# Patient Record
Sex: MALE | Race: WHITE | NOT HISPANIC OR LATINO | Employment: OTHER | URBAN - METROPOLITAN AREA
[De-identification: names, ages, dates, MRNs, and addresses within clinical notes are randomized per-mention and may not be internally consistent; named-entity substitution may affect disease eponyms.]

---

## 2017-01-01 ENCOUNTER — OFFICE VISIT (OUTPATIENT)
Dept: PULMONOLOGY | Facility: HOSPICE | Age: 82
End: 2017-01-01
Payer: MEDICARE

## 2017-01-01 ENCOUNTER — APPOINTMENT (OUTPATIENT)
Dept: RADIOLOGY | Facility: MEDICAL CENTER | Age: 82
DRG: 987 | End: 2017-01-01
Attending: UROLOGY
Payer: MEDICARE

## 2017-01-01 ENCOUNTER — APPOINTMENT (OUTPATIENT)
Dept: RADIOLOGY | Facility: MEDICAL CENTER | Age: 82
DRG: 987 | End: 2017-01-01
Attending: INTERNAL MEDICINE
Payer: MEDICARE

## 2017-01-01 ENCOUNTER — TELEPHONE (OUTPATIENT)
Dept: PULMONOLOGY | Facility: HOSPICE | Age: 82
End: 2017-01-01

## 2017-01-01 ENCOUNTER — APPOINTMENT (OUTPATIENT)
Dept: OTHER | Facility: MEDICAL CENTER | Age: 82
End: 2017-01-01
Attending: INTERNAL MEDICINE
Payer: MEDICARE

## 2017-01-01 ENCOUNTER — HOSPITAL ENCOUNTER (OUTPATIENT)
Dept: LAB | Facility: MEDICAL CENTER | Age: 82
End: 2017-06-16
Attending: INTERNAL MEDICINE
Payer: MEDICARE

## 2017-01-01 ENCOUNTER — HOSPITAL ENCOUNTER (OUTPATIENT)
Facility: MEDICAL CENTER | Age: 82
End: 2017-05-19
Attending: PHYSICIAN ASSISTANT
Payer: MEDICARE

## 2017-01-01 ENCOUNTER — APPOINTMENT (OUTPATIENT)
Dept: ADMISSIONS | Facility: MEDICAL CENTER | Age: 82
End: 2017-01-01
Payer: MEDICARE

## 2017-01-01 ENCOUNTER — OFFICE VISIT (OUTPATIENT)
Dept: MEDICAL GROUP | Facility: MEDICAL CENTER | Age: 82
End: 2017-01-01
Payer: MEDICARE

## 2017-01-01 ENCOUNTER — NON-PROVIDER VISIT (OUTPATIENT)
Dept: MEDICAL GROUP | Facility: MEDICAL CENTER | Age: 82
End: 2017-01-01
Payer: MEDICARE

## 2017-01-01 ENCOUNTER — TELEPHONE (OUTPATIENT)
Dept: MEDICAL GROUP | Facility: MEDICAL CENTER | Age: 82
End: 2017-01-01

## 2017-01-01 ENCOUNTER — HOSPITAL ENCOUNTER (OUTPATIENT)
Dept: OTHER | Facility: MEDICAL CENTER | Age: 82
End: 2017-05-23
Attending: INTERNAL MEDICINE
Payer: MEDICARE

## 2017-01-01 ENCOUNTER — HOSPITAL ENCOUNTER (OUTPATIENT)
Dept: OTHER | Facility: MEDICAL CENTER | Age: 82
End: 2017-06-06
Attending: INTERNAL MEDICINE
Payer: MEDICARE

## 2017-01-01 ENCOUNTER — HOSPITAL ENCOUNTER (OUTPATIENT)
Dept: OTHER | Facility: MEDICAL CENTER | Age: 82
End: 2017-04-25
Attending: INTERNAL MEDICINE
Payer: MEDICARE

## 2017-01-01 ENCOUNTER — TELEPHONE (OUTPATIENT)
Dept: URGENT CARE | Facility: CLINIC | Age: 82
End: 2017-01-01

## 2017-01-01 ENCOUNTER — HOSPITAL ENCOUNTER (OUTPATIENT)
Dept: RADIOLOGY | Facility: MEDICAL CENTER | Age: 82
End: 2017-06-26
Attending: INTERNAL MEDICINE
Payer: MEDICARE

## 2017-01-01 ENCOUNTER — HOSPITAL ENCOUNTER (OUTPATIENT)
Facility: MEDICAL CENTER | Age: 82
End: 2017-10-16
Attending: UROLOGY | Admitting: UROLOGY
Payer: MEDICARE

## 2017-01-01 ENCOUNTER — HOSPITAL ENCOUNTER (OUTPATIENT)
Dept: LAB | Facility: MEDICAL CENTER | Age: 82
End: 2017-08-14
Attending: NURSE PRACTITIONER
Payer: MEDICARE

## 2017-01-01 ENCOUNTER — OFFICE VISIT (OUTPATIENT)
Dept: URGENT CARE | Facility: PHYSICIAN GROUP | Age: 82
End: 2017-01-01
Payer: MEDICARE

## 2017-01-01 ENCOUNTER — HOSPITAL ENCOUNTER (OUTPATIENT)
Facility: MEDICAL CENTER | Age: 82
End: 2017-09-20
Attending: UROLOGY | Admitting: UROLOGY
Payer: MEDICARE

## 2017-01-01 ENCOUNTER — APPOINTMENT (OUTPATIENT)
Dept: RADIOLOGY | Facility: IMAGING CENTER | Age: 82
End: 2017-01-01
Attending: INTERNAL MEDICINE
Payer: MEDICARE

## 2017-01-01 ENCOUNTER — HOSPITAL ENCOUNTER (OUTPATIENT)
Dept: OTHER | Facility: MEDICAL CENTER | Age: 82
End: 2017-04-14
Attending: INTERNAL MEDICINE
Payer: MEDICARE

## 2017-01-01 ENCOUNTER — OFFICE VISIT (OUTPATIENT)
Dept: CARDIOLOGY | Facility: MEDICAL CENTER | Age: 82
End: 2017-01-01
Payer: MEDICARE

## 2017-01-01 ENCOUNTER — HOSPITAL ENCOUNTER (OUTPATIENT)
Dept: OTHER | Facility: MEDICAL CENTER | Age: 82
End: 2017-05-30
Attending: INTERNAL MEDICINE
Payer: MEDICARE

## 2017-01-01 ENCOUNTER — TELEPHONE (OUTPATIENT)
Dept: CARDIOLOGY | Facility: MEDICAL CENTER | Age: 82
End: 2017-01-01

## 2017-01-01 ENCOUNTER — HOSPITAL ENCOUNTER (OUTPATIENT)
Dept: OTHER | Facility: MEDICAL CENTER | Age: 82
End: 2017-05-09
Attending: INTERNAL MEDICINE
Payer: MEDICARE

## 2017-01-01 ENCOUNTER — HOSPITAL ENCOUNTER (OUTPATIENT)
Dept: OTHER | Facility: MEDICAL CENTER | Age: 82
End: 2017-04-27
Attending: INTERNAL MEDICINE
Payer: MEDICARE

## 2017-01-01 ENCOUNTER — HOSPITAL ENCOUNTER (INPATIENT)
Facility: MEDICAL CENTER | Age: 82
LOS: 2 days | DRG: 987 | End: 2017-11-04
Attending: UROLOGY | Admitting: INTERNAL MEDICINE
Payer: MEDICARE

## 2017-01-01 ENCOUNTER — HOSPITAL ENCOUNTER (OUTPATIENT)
Facility: MEDICAL CENTER | Age: 82
End: 2017-06-06
Attending: INTERNAL MEDICINE
Payer: MEDICARE

## 2017-01-01 ENCOUNTER — HOSPITAL ENCOUNTER (OUTPATIENT)
Dept: OTHER | Facility: MEDICAL CENTER | Age: 82
End: 2017-05-04
Attending: INTERNAL MEDICINE
Payer: MEDICARE

## 2017-01-01 ENCOUNTER — APPOINTMENT (OUTPATIENT)
Dept: RADIOLOGY | Facility: IMAGING CENTER | Age: 82
End: 2017-01-01
Payer: MEDICARE

## 2017-01-01 ENCOUNTER — APPOINTMENT (OUTPATIENT)
Dept: OTHER | Facility: MEDICAL CENTER | Age: 82
End: 2017-01-01
Payer: MEDICARE

## 2017-01-01 ENCOUNTER — HOSPITAL ENCOUNTER (OUTPATIENT)
Dept: OTHER | Facility: MEDICAL CENTER | Age: 82
End: 2017-02-09
Attending: NURSE PRACTITIONER
Payer: MEDICARE

## 2017-01-01 ENCOUNTER — HOSPITAL ENCOUNTER (OUTPATIENT)
Dept: OTHER | Facility: MEDICAL CENTER | Age: 82
End: 2017-06-01
Attending: INTERNAL MEDICINE
Payer: MEDICARE

## 2017-01-01 ENCOUNTER — HOSPITAL ENCOUNTER (OUTPATIENT)
Dept: OTHER | Facility: MEDICAL CENTER | Age: 82
End: 2017-05-11
Attending: INTERNAL MEDICINE
Payer: MEDICARE

## 2017-01-01 ENCOUNTER — HOSPITAL ENCOUNTER (OUTPATIENT)
Dept: OTHER | Facility: MEDICAL CENTER | Age: 82
End: 2017-05-25
Attending: INTERNAL MEDICINE
Payer: MEDICARE

## 2017-01-01 ENCOUNTER — HOSPITAL ENCOUNTER (OUTPATIENT)
Dept: OTHER | Facility: MEDICAL CENTER | Age: 82
End: 2017-05-02
Attending: INTERNAL MEDICINE
Payer: MEDICARE

## 2017-01-01 VITALS
DIASTOLIC BLOOD PRESSURE: 66 MMHG | HEART RATE: 83 BPM | WEIGHT: 150.6 LBS | OXYGEN SATURATION: 89 % | BODY MASS INDEX: 22.82 KG/M2 | RESPIRATION RATE: 16 BRPM | HEIGHT: 68 IN | TEMPERATURE: 98.2 F | SYSTOLIC BLOOD PRESSURE: 104 MMHG

## 2017-01-01 VITALS
HEART RATE: 88 BPM | OXYGEN SATURATION: 92 % | WEIGHT: 164 LBS | DIASTOLIC BLOOD PRESSURE: 88 MMHG | BODY MASS INDEX: 24.86 KG/M2 | HEIGHT: 68 IN | SYSTOLIC BLOOD PRESSURE: 142 MMHG

## 2017-01-01 VITALS
OXYGEN SATURATION: 94 % | HEIGHT: 68 IN | SYSTOLIC BLOOD PRESSURE: 124 MMHG | WEIGHT: 152.8 LBS | HEART RATE: 84 BPM | BODY MASS INDEX: 23.16 KG/M2 | DIASTOLIC BLOOD PRESSURE: 86 MMHG | RESPIRATION RATE: 16 BRPM

## 2017-01-01 VITALS
WEIGHT: 155.6 LBS | DIASTOLIC BLOOD PRESSURE: 64 MMHG | SYSTOLIC BLOOD PRESSURE: 112 MMHG | TEMPERATURE: 97.5 F | BODY MASS INDEX: 24.42 KG/M2 | HEART RATE: 89 BPM | RESPIRATION RATE: 16 BRPM | OXYGEN SATURATION: 95 % | HEIGHT: 67 IN

## 2017-01-01 VITALS
RESPIRATION RATE: 16 BRPM | DIASTOLIC BLOOD PRESSURE: 86 MMHG | HEART RATE: 89 BPM | SYSTOLIC BLOOD PRESSURE: 140 MMHG | HEIGHT: 67 IN | TEMPERATURE: 97.9 F | BODY MASS INDEX: 26.68 KG/M2 | WEIGHT: 170 LBS

## 2017-01-01 VITALS
DIASTOLIC BLOOD PRESSURE: 74 MMHG | HEART RATE: 87 BPM | HEIGHT: 68 IN | OXYGEN SATURATION: 93 % | TEMPERATURE: 98.2 F | SYSTOLIC BLOOD PRESSURE: 112 MMHG | WEIGHT: 152.45 LBS | BODY MASS INDEX: 23.1 KG/M2 | RESPIRATION RATE: 16 BRPM

## 2017-01-01 VITALS
BODY MASS INDEX: 23.53 KG/M2 | OXYGEN SATURATION: 100 % | SYSTOLIC BLOOD PRESSURE: 153 MMHG | HEART RATE: 65 BPM | TEMPERATURE: 98.6 F | RESPIRATION RATE: 24 BRPM | DIASTOLIC BLOOD PRESSURE: 84 MMHG | WEIGHT: 149.91 LBS | HEIGHT: 67 IN

## 2017-01-01 VITALS
BODY MASS INDEX: 24.23 KG/M2 | SYSTOLIC BLOOD PRESSURE: 132 MMHG | WEIGHT: 154.4 LBS | HEART RATE: 94 BPM | HEIGHT: 67 IN | DIASTOLIC BLOOD PRESSURE: 86 MMHG | OXYGEN SATURATION: 86 %

## 2017-01-01 VITALS
OXYGEN SATURATION: 97 % | DIASTOLIC BLOOD PRESSURE: 95 MMHG | SYSTOLIC BLOOD PRESSURE: 152 MMHG | RESPIRATION RATE: 20 BRPM | WEIGHT: 152.12 LBS | TEMPERATURE: 97.9 F | HEART RATE: 68 BPM | BODY MASS INDEX: 23.88 KG/M2 | HEIGHT: 67 IN

## 2017-01-01 VITALS
BODY MASS INDEX: 25.95 KG/M2 | SYSTOLIC BLOOD PRESSURE: 129 MMHG | OXYGEN SATURATION: 96 % | RESPIRATION RATE: 22 BRPM | DIASTOLIC BLOOD PRESSURE: 83 MMHG | WEIGHT: 165.34 LBS | HEART RATE: 64 BPM | HEIGHT: 67 IN | TEMPERATURE: 98.6 F

## 2017-01-01 VITALS
WEIGHT: 162 LBS | OXYGEN SATURATION: 96 % | BODY MASS INDEX: 25.43 KG/M2 | HEIGHT: 67 IN | SYSTOLIC BLOOD PRESSURE: 136 MMHG | DIASTOLIC BLOOD PRESSURE: 90 MMHG | HEART RATE: 102 BPM | RESPIRATION RATE: 18 BRPM | TEMPERATURE: 97.3 F

## 2017-01-01 VITALS
WEIGHT: 170 LBS | HEART RATE: 112 BPM | OXYGEN SATURATION: 89 % | DIASTOLIC BLOOD PRESSURE: 70 MMHG | BODY MASS INDEX: 25.76 KG/M2 | RESPIRATION RATE: 18 BRPM | HEIGHT: 68 IN | SYSTOLIC BLOOD PRESSURE: 122 MMHG

## 2017-01-01 DIAGNOSIS — R91.8 LUNG MASS: ICD-10-CM

## 2017-01-01 DIAGNOSIS — J44.1 COPD WITH EXACERBATION (HCC): ICD-10-CM

## 2017-01-01 DIAGNOSIS — N30.00 ACUTE CYSTITIS WITHOUT HEMATURIA: ICD-10-CM

## 2017-01-01 DIAGNOSIS — J96.11 CHRONIC RESPIRATORY FAILURE WITH HYPOXIA (HCC): ICD-10-CM

## 2017-01-01 DIAGNOSIS — R60.0 EDEMA OF BOTH LEGS: ICD-10-CM

## 2017-01-01 DIAGNOSIS — N39.0 RECURRENT UTI: ICD-10-CM

## 2017-01-01 DIAGNOSIS — I48.91 ATRIAL FIBRILLATION, UNSPECIFIED TYPE (HCC): ICD-10-CM

## 2017-01-01 DIAGNOSIS — J44.9 CHRONIC OBSTRUCTIVE PULMONARY DISEASE, UNSPECIFIED COPD TYPE (HCC): ICD-10-CM

## 2017-01-01 DIAGNOSIS — I10 HTN (HYPERTENSION), BENIGN: ICD-10-CM

## 2017-01-01 DIAGNOSIS — J42 CHRONIC BRONCHITIS, UNSPECIFIED CHRONIC BRONCHITIS TYPE (HCC): ICD-10-CM

## 2017-01-01 DIAGNOSIS — R30.0 DYSURIA: ICD-10-CM

## 2017-01-01 DIAGNOSIS — Z01.810 PRE-OPERATIVE CARDIOVASCULAR EXAMINATION: ICD-10-CM

## 2017-01-01 DIAGNOSIS — Z79.01 CHRONIC ANTICOAGULATION: ICD-10-CM

## 2017-01-01 DIAGNOSIS — Z01.812 PRE-PROCEDURAL LABORATORY EXAMINATION: ICD-10-CM

## 2017-01-01 DIAGNOSIS — M79.89 LEG SWELLING: ICD-10-CM

## 2017-01-01 DIAGNOSIS — D49.4 NEOPLASM, BLADDER: ICD-10-CM

## 2017-01-01 DIAGNOSIS — N39.0 URINARY TRACT INFECTION WITHOUT HEMATURIA, SITE UNSPECIFIED: ICD-10-CM

## 2017-01-01 DIAGNOSIS — R91.8 MASS OF LOWER LOBE OF LEFT LUNG: ICD-10-CM

## 2017-01-01 DIAGNOSIS — J44.9 COPD, SEVERE (HCC): ICD-10-CM

## 2017-01-01 DIAGNOSIS — J43.9 PULMONARY EMPHYSEMA, UNSPECIFIED EMPHYSEMA TYPE (HCC): ICD-10-CM

## 2017-01-01 DIAGNOSIS — R80.9 PROTEINURIA, UNSPECIFIED TYPE: ICD-10-CM

## 2017-01-01 DIAGNOSIS — R60.0 LOCALIZED EDEMA: ICD-10-CM

## 2017-01-01 DIAGNOSIS — R06.02 SOB (SHORTNESS OF BREATH): ICD-10-CM

## 2017-01-01 DIAGNOSIS — Z01.812 PRE-OPERATIVE LABORATORY EXAMINATION: ICD-10-CM

## 2017-01-01 LAB
ALBUMIN SERPL BCP-MCNC: 2.3 G/DL (ref 3.2–4.9)
ALBUMIN SERPL BCP-MCNC: 2.4 G/DL (ref 3.2–4.9)
ALBUMIN SERPL BCP-MCNC: 3.3 G/DL (ref 3.2–4.9)
ALBUMIN SERPL BCP-MCNC: 3.4 G/DL (ref 3.2–4.9)
ALBUMIN SERPL BCP-MCNC: 3.4 G/DL (ref 3.2–4.9)
ALBUMIN SERPL BCP-MCNC: 3.6 G/DL (ref 3.2–4.9)
ALBUMIN/GLOB SERPL: 1 G/DL
ALBUMIN/GLOB SERPL: 1.1 G/DL
ALBUMIN/GLOB SERPL: 1.1 G/DL
ALBUMIN/GLOB SERPL: 1.2 G/DL
ALBUMIN/GLOB SERPL: 1.3 G/DL
ALBUMIN/GLOB SERPL: 1.3 G/DL
ALP SERPL-CCNC: 39 U/L (ref 30–99)
ALP SERPL-CCNC: 43 U/L (ref 30–99)
ALP SERPL-CCNC: 51 U/L (ref 30–99)
ALP SERPL-CCNC: 54 U/L (ref 30–99)
ALP SERPL-CCNC: 55 U/L (ref 30–99)
ALP SERPL-CCNC: 59 U/L (ref 30–99)
ALT SERPL-CCNC: 16 U/L (ref 2–50)
ALT SERPL-CCNC: 41 U/L (ref 2–50)
ALT SERPL-CCNC: 5 U/L (ref 2–50)
ALT SERPL-CCNC: 6 U/L (ref 2–50)
ALT SERPL-CCNC: 8 U/L (ref 2–50)
ALT SERPL-CCNC: 9 U/L (ref 2–50)
ANION GAP SERPL CALC-SCNC: 3 MMOL/L (ref 0–11.9)
ANION GAP SERPL CALC-SCNC: 6 MMOL/L (ref 0–11.9)
ANION GAP SERPL CALC-SCNC: 7 MMOL/L (ref 0–11.9)
ANION GAP SERPL CALC-SCNC: 7 MMOL/L (ref 0–11.9)
ANION GAP SERPL CALC-SCNC: 8 MMOL/L (ref 0–11.9)
ANION GAP SERPL CALC-SCNC: 8 MMOL/L (ref 0–11.9)
ANISOCYTOSIS BLD QL SMEAR: ABNORMAL
APPEARANCE UR: ABNORMAL
APPEARANCE UR: ABNORMAL
APPEARANCE UR: CLEAR
APPEARANCE UR: CLEAR
APPEARANCE UR: NORMAL
APTT PPP: 30.9 SEC (ref 24.7–36)
APTT PPP: 34.9 SEC (ref 24.7–36)
APTT PPP: 34.9 SEC (ref 24.7–36)
AST SERPL-CCNC: 12 U/L (ref 12–45)
AST SERPL-CCNC: 14 U/L (ref 12–45)
AST SERPL-CCNC: 17 U/L (ref 12–45)
AST SERPL-CCNC: 19 U/L (ref 12–45)
AST SERPL-CCNC: 20 U/L (ref 12–45)
AST SERPL-CCNC: 49 U/L (ref 12–45)
BACTERIA #/AREA URNS HPF: ABNORMAL /HPF
BACTERIA #/AREA URNS HPF: ABNORMAL /HPF
BACTERIA #/AREA URNS HPF: NEGATIVE /HPF
BACTERIA SPEC RESP CULT: ABNORMAL
BACTERIA SPEC RESP CULT: ABNORMAL
BACTERIA UR CULT: ABNORMAL
BASE EXCESS BLDA CALC-SCNC: 3 MMOL/L (ref -4–3)
BASE EXCESS BLDA CALC-SCNC: 4 MMOL/L (ref -4–3)
BASE EXCESS BLDA CALC-SCNC: 4 MMOL/L (ref -4–3)
BASE EXCESS BLDA CALC-SCNC: 5 MMOL/L (ref -4–3)
BASE EXCESS BLDA CALC-SCNC: 7 MMOL/L (ref -4–3)
BASOPHILS # BLD AUTO: 0 % (ref 0–1.8)
BASOPHILS # BLD AUTO: 0.1 % (ref 0–1.8)
BASOPHILS # BLD AUTO: 0.4 % (ref 0–1.8)
BASOPHILS # BLD AUTO: 0.5 % (ref 0–1.8)
BASOPHILS # BLD: 0 K/UL (ref 0–0.12)
BASOPHILS # BLD: 0.01 K/UL (ref 0–0.12)
BASOPHILS # BLD: 0.04 K/UL (ref 0–0.12)
BASOPHILS # BLD: 0.06 K/UL (ref 0–0.12)
BILIRUB SERPL-MCNC: 0.6 MG/DL (ref 0.1–1.5)
BILIRUB SERPL-MCNC: 0.6 MG/DL (ref 0.1–1.5)
BILIRUB SERPL-MCNC: 0.7 MG/DL (ref 0.1–1.5)
BILIRUB SERPL-MCNC: 0.9 MG/DL (ref 0.1–1.5)
BILIRUB SERPL-MCNC: 1.2 MG/DL (ref 0.1–1.5)
BILIRUB SERPL-MCNC: 1.4 MG/DL (ref 0.1–1.5)
BILIRUB UR QL STRIP.AUTO: NEGATIVE
BILIRUB UR STRIP-MCNC: ABNORMAL MG/DL
BILIRUB UR STRIP-MCNC: NORMAL MG/DL
BNP SERPL-MCNC: 150 PG/ML (ref 0–100)
BODY TEMPERATURE: 101.1 DEGREES
BODY TEMPERATURE: 98 DEGREES
BODY TEMPERATURE: ABNORMAL DEGREES
BUN SERPL-MCNC: 19 MG/DL (ref 8–22)
BUN SERPL-MCNC: 23 MG/DL (ref 8–22)
BUN SERPL-MCNC: 24 MG/DL (ref 8–22)
BUN SERPL-MCNC: 28 MG/DL (ref 8–22)
BUN SERPL-MCNC: 30 MG/DL (ref 8–22)
BUN SERPL-MCNC: 33 MG/DL (ref 8–22)
CALCIUM SERPL-MCNC: 10 MG/DL (ref 8.5–10.5)
CALCIUM SERPL-MCNC: 10.1 MG/DL (ref 8.5–10.5)
CALCIUM SERPL-MCNC: 10.5 MG/DL (ref 8.5–10.5)
CALCIUM SERPL-MCNC: 10.8 MG/DL (ref 8.5–10.5)
CALCIUM SERPL-MCNC: 9 MG/DL (ref 8.5–10.5)
CALCIUM SERPL-MCNC: 9.7 MG/DL (ref 8.5–10.5)
CHLORIDE SERPL-SCNC: 100 MMOL/L (ref 96–112)
CHLORIDE SERPL-SCNC: 100 MMOL/L (ref 96–112)
CHLORIDE SERPL-SCNC: 101 MMOL/L (ref 96–112)
CHLORIDE SERPL-SCNC: 103 MMOL/L (ref 96–112)
CHLORIDE SERPL-SCNC: 95 MMOL/L (ref 96–112)
CHLORIDE SERPL-SCNC: 99 MMOL/L (ref 96–112)
CO2 BLDA-SCNC: 30 MMOL/L (ref 20–33)
CO2 BLDA-SCNC: 31 MMOL/L (ref 20–33)
CO2 BLDA-SCNC: 31 MMOL/L (ref 20–33)
CO2 BLDA-SCNC: 33 MMOL/L (ref 20–33)
CO2 BLDA-SCNC: 34 MMOL/L (ref 20–33)
CO2 SERPL-SCNC: 27 MMOL/L (ref 20–33)
CO2 SERPL-SCNC: 27 MMOL/L (ref 20–33)
CO2 SERPL-SCNC: 29 MMOL/L (ref 20–33)
CO2 SERPL-SCNC: 30 MMOL/L (ref 20–33)
COLOR UR AUTO: NORMAL
COLOR UR AUTO: YELLOW
COLOR UR: YELLOW
CREAT SERPL-MCNC: 0.81 MG/DL (ref 0.5–1.4)
CREAT SERPL-MCNC: 0.85 MG/DL (ref 0.5–1.4)
CREAT SERPL-MCNC: 0.86 MG/DL (ref 0.5–1.4)
CREAT SERPL-MCNC: 0.95 MG/DL (ref 0.5–1.4)
CREAT SERPL-MCNC: 1.1 MG/DL (ref 0.5–1.4)
CREAT SERPL-MCNC: 1.54 MG/DL (ref 0.5–1.4)
CULTURE IF INDICATED INDCX: NO UA CULTURE
CULTURE IF INDICATED INDCX: YES UA CULTURE
EKG IMPRESSION: NORMAL
EKG IMPRESSION: NORMAL
EOSINOPHIL # BLD AUTO: 0.02 K/UL (ref 0–0.51)
EOSINOPHIL # BLD AUTO: 0.06 K/UL (ref 0–0.51)
EOSINOPHIL # BLD AUTO: 0.08 K/UL (ref 0–0.51)
EOSINOPHIL # BLD AUTO: 0.22 K/UL (ref 0–0.51)
EOSINOPHIL NFR BLD: 0.2 % (ref 0–6.9)
EOSINOPHIL NFR BLD: 0.9 % (ref 0–6.9)
EOSINOPHIL NFR BLD: 0.9 % (ref 0–6.9)
EOSINOPHIL NFR BLD: 1.7 % (ref 0–6.9)
EPI CELLS #/AREA URNS HPF: NEGATIVE /HPF
EPI CELLS #/AREA URNS HPF: NEGATIVE /HPF
ERYTHROCYTE [DISTWIDTH] IN BLOOD BY AUTOMATED COUNT: 49.1 FL (ref 35.9–50)
ERYTHROCYTE [DISTWIDTH] IN BLOOD BY AUTOMATED COUNT: 50.2 FL (ref 35.9–50)
ERYTHROCYTE [DISTWIDTH] IN BLOOD BY AUTOMATED COUNT: 51.1 FL (ref 35.9–50)
ERYTHROCYTE [DISTWIDTH] IN BLOOD BY AUTOMATED COUNT: 51.8 FL (ref 35.9–50)
ERYTHROCYTE [DISTWIDTH] IN BLOOD BY AUTOMATED COUNT: 52.1 FL (ref 35.9–50)
ERYTHROCYTE [DISTWIDTH] IN BLOOD BY AUTOMATED COUNT: 52.2 FL (ref 35.9–50)
EXPOSED MRN EXMRN: NORMAL
GFR SERPL CREATININE-BSD FRML MDRD: 44 ML/MIN/1.73 M 2
GFR SERPL CREATININE-BSD FRML MDRD: >60 ML/MIN/1.73 M 2
GLOBULIN SER CALC-MCNC: 2.1 G/DL (ref 1.9–3.5)
GLOBULIN SER CALC-MCNC: 2.5 G/DL (ref 1.9–3.5)
GLOBULIN SER CALC-MCNC: 2.6 G/DL (ref 1.9–3.5)
GLOBULIN SER CALC-MCNC: 2.7 G/DL (ref 1.9–3.5)
GLOBULIN SER CALC-MCNC: 2.8 G/DL (ref 1.9–3.5)
GLOBULIN SER CALC-MCNC: 3.1 G/DL (ref 1.9–3.5)
GLUCOSE BLD-MCNC: 102 MG/DL (ref 65–99)
GLUCOSE BLD-MCNC: 115 MG/DL (ref 65–99)
GLUCOSE BLD-MCNC: 131 MG/DL (ref 65–99)
GLUCOSE BLD-MCNC: 136 MG/DL (ref 65–99)
GLUCOSE BLD-MCNC: 137 MG/DL (ref 65–99)
GLUCOSE BLD-MCNC: 150 MG/DL (ref 65–99)
GLUCOSE BLD-MCNC: 81 MG/DL (ref 65–99)
GLUCOSE BLD-MCNC: 90 MG/DL (ref 65–99)
GLUCOSE BLD-MCNC: 92 MG/DL (ref 65–99)
GLUCOSE SERPL-MCNC: 108 MG/DL (ref 65–99)
GLUCOSE SERPL-MCNC: 110 MG/DL (ref 65–99)
GLUCOSE SERPL-MCNC: 132 MG/DL (ref 65–99)
GLUCOSE SERPL-MCNC: 77 MG/DL (ref 65–99)
GLUCOSE SERPL-MCNC: 82 MG/DL (ref 65–99)
GLUCOSE SERPL-MCNC: 84 MG/DL (ref 65–99)
GLUCOSE UR STRIP.AUTO-MCNC: ABNORMAL MG/DL
GLUCOSE UR STRIP.AUTO-MCNC: NEGATIVE MG/DL
GLUCOSE UR STRIP.AUTO-MCNC: NORMAL MG/DL
GRAM STN SPEC: ABNORMAL
GRAM STN SPEC: NORMAL
HBV SURFACE AG SER QL: NEGATIVE
HCO3 BLDA-SCNC: 28.7 MMOL/L (ref 17–25)
HCO3 BLDA-SCNC: 29.3 MMOL/L (ref 17–25)
HCO3 BLDA-SCNC: 29.7 MMOL/L (ref 17–25)
HCO3 BLDA-SCNC: 31.5 MMOL/L (ref 17–25)
HCO3 BLDA-SCNC: 32.2 MMOL/L (ref 17–25)
HCT VFR BLD AUTO: 29.6 % (ref 42–52)
HCT VFR BLD AUTO: 30.3 % (ref 42–52)
HCT VFR BLD AUTO: 35.9 % (ref 42–52)
HCT VFR BLD AUTO: 37.8 % (ref 42–52)
HCT VFR BLD AUTO: 38 % (ref 42–52)
HCT VFR BLD AUTO: 39.9 % (ref 42–52)
HCV AB SER QL: NEGATIVE
HGB BLD-MCNC: 10 G/DL (ref 14–18)
HGB BLD-MCNC: 11.9 G/DL (ref 14–18)
HGB BLD-MCNC: 12.3 G/DL (ref 14–18)
HGB BLD-MCNC: 12.3 G/DL (ref 14–18)
HGB BLD-MCNC: 13.7 G/DL (ref 14–18)
HGB BLD-MCNC: 9.7 G/DL (ref 14–18)
HIV 1+2 AB+HIV1 P24 AG SERPL QL IA: NON REACTIVE
HYALINE CASTS #/AREA URNS LPF: ABNORMAL /LPF
HYALINE CASTS #/AREA URNS LPF: ABNORMAL /LPF
IMM GRANULOCYTES # BLD AUTO: 0.06 K/UL (ref 0–0.11)
IMM GRANULOCYTES # BLD AUTO: 0.08 K/UL (ref 0–0.11)
IMM GRANULOCYTES # BLD AUTO: 0.1 K/UL (ref 0–0.11)
IMM GRANULOCYTES NFR BLD AUTO: 0.7 % (ref 0–0.9)
IMM GRANULOCYTES NFR BLD AUTO: 0.7 % (ref 0–0.9)
IMM GRANULOCYTES NFR BLD AUTO: 0.8 % (ref 0–0.9)
INR PPP: 1.26 (ref 0.87–1.13)
KETONES UR STRIP.AUTO-MCNC: ABNORMAL MG/DL
KETONES UR STRIP.AUTO-MCNC: NEGATIVE MG/DL
KETONES UR STRIP.AUTO-MCNC: NORMAL MG/DL
LACTATE BLD-SCNC: 1.5 MMOL/L (ref 0.5–2)
LEUKOCYTE ESTERASE UR QL STRIP.AUTO: ABNORMAL
LEUKOCYTE ESTERASE UR QL STRIP.AUTO: NEGATIVE
LEUKOCYTE ESTERASE UR QL STRIP.AUTO: NORMAL
LV EJECT FRACT  99904: 60
LV EJECT FRACT MOD 2C 99903: 49.09
LV EJECT FRACT MOD 4C 99902: 58.04
LV EJECT FRACT MOD BP 99901: 52.64
LYMPHOCYTES # BLD AUTO: 0.3 K/UL (ref 1–4.8)
LYMPHOCYTES # BLD AUTO: 0.53 K/UL (ref 1–4.8)
LYMPHOCYTES # BLD AUTO: 0.56 K/UL (ref 1–4.8)
LYMPHOCYTES # BLD AUTO: 3.31 K/UL (ref 1–4.8)
LYMPHOCYTES NFR BLD: 26.3 % (ref 22–41)
LYMPHOCYTES NFR BLD: 4.3 % (ref 22–41)
LYMPHOCYTES NFR BLD: 5.2 % (ref 22–41)
LYMPHOCYTES NFR BLD: 6.3 % (ref 22–41)
MACROCYTES BLD QL SMEAR: ABNORMAL
MAGNESIUM SERPL-MCNC: 1.3 MG/DL (ref 1.5–2.5)
MAGNESIUM SERPL-MCNC: 2.1 MG/DL (ref 1.5–2.5)
MANUAL DIFF BLD: NORMAL
MCH RBC QN AUTO: 33 PG (ref 27–33)
MCH RBC QN AUTO: 33.1 PG (ref 27–33)
MCH RBC QN AUTO: 33.4 PG (ref 27–33)
MCH RBC QN AUTO: 33.4 PG (ref 27–33)
MCH RBC QN AUTO: 33.5 PG (ref 27–33)
MCH RBC QN AUTO: 33.6 PG (ref 27–33)
MCHC RBC AUTO-ENTMCNC: 32.4 G/DL (ref 33.7–35.3)
MCHC RBC AUTO-ENTMCNC: 32.5 G/DL (ref 33.7–35.3)
MCHC RBC AUTO-ENTMCNC: 32.8 G/DL (ref 33.7–35.3)
MCHC RBC AUTO-ENTMCNC: 33 G/DL (ref 33.7–35.3)
MCHC RBC AUTO-ENTMCNC: 33.1 G/DL (ref 33.7–35.3)
MCHC RBC AUTO-ENTMCNC: 34.3 G/DL (ref 33.7–35.3)
MCV RBC AUTO: 101 FL (ref 81.4–97.8)
MCV RBC AUTO: 101.1 FL (ref 81.4–97.8)
MCV RBC AUTO: 101.3 FL (ref 81.4–97.8)
MCV RBC AUTO: 101.9 FL (ref 81.4–97.8)
MCV RBC AUTO: 103.3 FL (ref 81.4–97.8)
MCV RBC AUTO: 97.3 FL (ref 81.4–97.8)
MICRO URNS: ABNORMAL
MONOCYTES # BLD AUTO: 0.06 K/UL (ref 0–0.85)
MONOCYTES # BLD AUTO: 0.5 K/UL (ref 0–0.85)
MONOCYTES # BLD AUTO: 0.58 K/UL (ref 0–0.85)
MONOCYTES # BLD AUTO: 1.13 K/UL (ref 0–0.85)
MONOCYTES NFR BLD AUTO: 0.9 % (ref 0–13.4)
MONOCYTES NFR BLD AUTO: 5.4 % (ref 0–13.4)
MONOCYTES NFR BLD AUTO: 5.9 % (ref 0–13.4)
MONOCYTES NFR BLD AUTO: 9 % (ref 0–13.4)
MORPHOLOGY BLD-IMP: NORMAL
NEUTROPHILS # BLD AUTO: 6.57 K/UL (ref 1.82–7.42)
NEUTROPHILS # BLD AUTO: 7.27 K/UL (ref 1.82–7.42)
NEUTROPHILS # BLD AUTO: 7.76 K/UL (ref 1.82–7.42)
NEUTROPHILS # BLD AUTO: 9.54 K/UL (ref 1.82–7.42)
NEUTROPHILS NFR BLD: 61.7 % (ref 44–72)
NEUTROPHILS NFR BLD: 86.1 % (ref 44–72)
NEUTROPHILS NFR BLD: 88.1 % (ref 44–72)
NEUTROPHILS NFR BLD: 91.3 % (ref 44–72)
NEUTS BAND NFR BLD MANUAL: 2.6 % (ref 0–10)
NITRITE UR QL STRIP.AUTO: NEGATIVE
NITRITE UR QL STRIP.AUTO: NORMAL
NRBC # BLD AUTO: 0 K/UL
NRBC BLD AUTO-RTO: 0 /100 WBC
O2/TOTAL GAS SETTING VFR VENT: 100 %
O2/TOTAL GAS SETTING VFR VENT: 40 %
O2/TOTAL GAS SETTING VFR VENT: 60 %
OVALOCYTES BLD QL SMEAR: NORMAL
PCO2 BLDA: 42.8 MMHG (ref 26–37)
PCO2 BLDA: 43.3 MMHG (ref 26–37)
PCO2 BLDA: 47 MMHG (ref 26–37)
PCO2 BLDA: 49.6 MMHG (ref 26–37)
PCO2 BLDA: 70.8 MMHG (ref 26–37)
PCO2 TEMP ADJ BLDA: 40.7 MMHG (ref 26–37)
PCO2 TEMP ADJ BLDA: 43.3 MMHG (ref 26–37)
PCO2 TEMP ADJ BLDA: 46.4 MMHG (ref 26–37)
PCO2 TEMP ADJ BLDA: 52.7 MMHG (ref 26–37)
PCO2 TEMP ADJ BLDA: 69.8 MMHG (ref 26–37)
PH BLDA: 7.27 [PH] (ref 7.4–7.5)
PH BLDA: 7.39 [PH] (ref 7.4–7.5)
PH BLDA: 7.39 [PH] (ref 7.4–7.5)
PH BLDA: 7.44 [PH] (ref 7.4–7.5)
PH BLDA: 7.47 [PH] (ref 7.4–7.5)
PH TEMP ADJ BLDA: 7.27 [PH] (ref 7.4–7.5)
PH TEMP ADJ BLDA: 7.37 [PH] (ref 7.4–7.5)
PH TEMP ADJ BLDA: 7.4 [PH] (ref 7.4–7.5)
PH TEMP ADJ BLDA: 7.46 [PH] (ref 7.4–7.5)
PH TEMP ADJ BLDA: 7.47 [PH] (ref 7.4–7.5)
PH UR STRIP.AUTO: 6.5 [PH]
PH UR STRIP.AUTO: 7 [PH]
PH UR STRIP.AUTO: 7 [PH] (ref 5–8)
PH UR STRIP.AUTO: 7.5 [PH]
PH UR STRIP.AUTO: 8 [PH] (ref 5–8)
PHOSPHATE SERPL-MCNC: 2 MG/DL (ref 2.5–4.5)
PHOSPHATE SERPL-MCNC: 2.4 MG/DL (ref 2.5–4.5)
PLATELET # BLD AUTO: 135 K/UL (ref 164–446)
PLATELET # BLD AUTO: 141 K/UL (ref 164–446)
PLATELET # BLD AUTO: 196 K/UL (ref 164–446)
PLATELET # BLD AUTO: 214 K/UL (ref 164–446)
PLATELET # BLD AUTO: 217 K/UL (ref 164–446)
PLATELET # BLD AUTO: 222 K/UL (ref 164–446)
PLATELET BLD QL SMEAR: NORMAL
PMV BLD AUTO: 8.5 FL (ref 9–12.9)
PMV BLD AUTO: 8.7 FL (ref 9–12.9)
PMV BLD AUTO: 9 FL (ref 9–12.9)
PMV BLD AUTO: 9.2 FL (ref 9–12.9)
PO2 BLDA: 36 MMHG (ref 64–87)
PO2 BLDA: 39 MMHG (ref 64–87)
PO2 BLDA: 69 MMHG (ref 64–87)
PO2 BLDA: 73 MMHG (ref 64–87)
PO2 BLDA: 79 MMHG (ref 64–87)
PO2 TEMP ADJ BLDA: 35 MMHG (ref 64–87)
PO2 TEMP ADJ BLDA: 43 MMHG (ref 64–87)
PO2 TEMP ADJ BLDA: 67 MMHG (ref 64–87)
PO2 TEMP ADJ BLDA: 67 MMHG (ref 64–87)
PO2 TEMP ADJ BLDA: 79 MMHG (ref 64–87)
POIKILOCYTOSIS BLD QL SMEAR: NORMAL
POTASSIUM SERPL-SCNC: 3.5 MMOL/L (ref 3.6–5.5)
POTASSIUM SERPL-SCNC: 3.5 MMOL/L (ref 3.6–5.5)
POTASSIUM SERPL-SCNC: 3.7 MMOL/L (ref 3.6–5.5)
POTASSIUM SERPL-SCNC: 4.2 MMOL/L (ref 3.6–5.5)
POTASSIUM SERPL-SCNC: 4.2 MMOL/L (ref 3.6–5.5)
POTASSIUM SERPL-SCNC: 4.6 MMOL/L (ref 3.6–5.5)
PROT SERPL-MCNC: 4.4 G/DL (ref 6–8.2)
PROT SERPL-MCNC: 4.9 G/DL (ref 6–8.2)
PROT SERPL-MCNC: 5.9 G/DL (ref 6–8.2)
PROT SERPL-MCNC: 6.2 G/DL (ref 6–8.2)
PROT SERPL-MCNC: 6.3 G/DL (ref 6–8.2)
PROT SERPL-MCNC: 6.5 G/DL (ref 6–8.2)
PROT UR QL STRIP: 100 MG/DL
PROT UR QL STRIP: 2000 MG/DL
PROT UR QL STRIP: 30 MG/DL
PROT UR QL STRIP: 50 MG/DL
PROT UR QL STRIP: NEGATIVE MG/DL
PROTHROMBIN TIME: 15.5 SEC (ref 12–14.6)
RBC # BLD AUTO: 2.93 M/UL (ref 4.7–6.1)
RBC # BLD AUTO: 2.99 M/UL (ref 4.7–6.1)
RBC # BLD AUTO: 3.55 M/UL (ref 4.7–6.1)
RBC # BLD AUTO: 3.66 M/UL (ref 4.7–6.1)
RBC # BLD AUTO: 3.73 M/UL (ref 4.7–6.1)
RBC # BLD AUTO: 4.1 M/UL (ref 4.7–6.1)
RBC # URNS HPF: >150 /HPF
RBC # URNS HPF: >150 /HPF
RBC # URNS HPF: ABNORMAL /HPF
RBC BLD AUTO: PRESENT
RBC UR QL AUTO: ABNORMAL
RBC UR QL AUTO: NORMAL
SAO2 % BLDA: 68 % (ref 93–99)
SAO2 % BLDA: 72 % (ref 93–99)
SAO2 % BLDA: 90 % (ref 93–99)
SAO2 % BLDA: 95 % (ref 93–99)
SAO2 % BLDA: 96 % (ref 93–99)
SIGNIFICANT IND 70042: ABNORMAL
SIGNIFICANT IND 70042: NORMAL
SITE SITE: ABNORMAL
SITE SITE: NORMAL
SODIUM SERPL-SCNC: 130 MMOL/L (ref 135–145)
SODIUM SERPL-SCNC: 133 MMOL/L (ref 135–145)
SODIUM SERPL-SCNC: 136 MMOL/L (ref 135–145)
SODIUM SERPL-SCNC: 136 MMOL/L (ref 135–145)
SODIUM SERPL-SCNC: 137 MMOL/L (ref 135–145)
SODIUM SERPL-SCNC: 138 MMOL/L (ref 135–145)
SOURCE SOURCE: ABNORMAL
SOURCE SOURCE: NORMAL
SP GR UR STRIP.AUTO: 1.01
SP GR UR STRIP.AUTO: 1.02
SPECIMEN DRAWN FROM PATIENT: ABNORMAL
TRIGL SERPL-MCNC: 92 MG/DL (ref 0–149)
UROBILINOGEN UR STRIP-MCNC: 0.2 MG/DL
UROBILINOGEN UR STRIP-MCNC: 2 MG/DL
UROBILINOGEN UR STRIP.AUTO-MCNC: 0.2 MG/DL
UROBILINOGEN UR STRIP.AUTO-MCNC: 0.2 MG/DL
VANCOMYCIN TROUGH SERPL-MCNC: 16 UG/ML (ref 10–20)
WBC # BLD AUTO: 10.8 K/UL (ref 4.8–10.8)
WBC # BLD AUTO: 12.6 K/UL (ref 4.8–10.8)
WBC # BLD AUTO: 7 K/UL (ref 4.8–10.8)
WBC # BLD AUTO: 7 K/UL (ref 4.8–10.8)
WBC # BLD AUTO: 8.4 K/UL (ref 4.8–10.8)
WBC # BLD AUTO: 8.5 K/UL (ref 4.8–10.8)
WBC #/AREA URNS HPF: >150 /HPF
WBC #/AREA URNS HPF: ABNORMAL /HPF
WBC #/AREA URNS HPF: ABNORMAL /HPF

## 2017-01-01 PROCEDURE — 93010 ELECTROCARDIOGRAM REPORT: CPT | Performed by: INTERNAL MEDICINE

## 2017-01-01 PROCEDURE — 99214 OFFICE O/P EST MOD 30 MIN: CPT | Performed by: INTERNAL MEDICINE

## 2017-01-01 PROCEDURE — 0518F FALL PLAN OF CARE DOCD: CPT | Mod: 8P | Performed by: INTERNAL MEDICINE

## 2017-01-01 PROCEDURE — 36415 COLL VENOUS BLD VENIPUNCTURE: CPT

## 2017-01-01 PROCEDURE — 85025 COMPLETE CBC W/AUTO DIFF WBC: CPT

## 2017-01-01 PROCEDURE — 94760 N-INVAS EAR/PLS OXIMETRY 1: CPT

## 2017-01-01 PROCEDURE — 700101 HCHG RX REV CODE 250

## 2017-01-01 PROCEDURE — A4346 CATH INDW FOLEY 3 WAY: HCPCS | Performed by: UROLOGY

## 2017-01-01 PROCEDURE — 4040F PNEUMOC VAC/ADMIN/RCVD: CPT | Performed by: INTERNAL MEDICINE

## 2017-01-01 PROCEDURE — 80202 ASSAY OF VANCOMYCIN: CPT

## 2017-01-01 PROCEDURE — G0424 PULMONARY REHAB W EXER: HCPCS

## 2017-01-01 PROCEDURE — G8420 CALC BMI NORM PARAMETERS: HCPCS | Performed by: INTERNAL MEDICINE

## 2017-01-01 PROCEDURE — 700105 HCHG RX REV CODE 258: Performed by: UROLOGY

## 2017-01-01 PROCEDURE — 36600 WITHDRAWAL OF ARTERIAL BLOOD: CPT

## 2017-01-01 PROCEDURE — 94770 HCHG CO2 EXPIRED GAS DETERMINATION: CPT

## 2017-01-01 PROCEDURE — 94640 AIRWAY INHALATION TREATMENT: CPT

## 2017-01-01 PROCEDURE — 87070 CULTURE OTHR SPECIMN AEROBIC: CPT

## 2017-01-01 PROCEDURE — 87077 CULTURE AEROBIC IDENTIFY: CPT

## 2017-01-01 PROCEDURE — 700102 HCHG RX REV CODE 250 W/ 637 OVERRIDE(OP): Performed by: UROLOGY

## 2017-01-01 PROCEDURE — 160028 HCHG SURGERY MINUTES - 1ST 30 MINS LEVEL 3: Performed by: UROLOGY

## 2017-01-01 PROCEDURE — 700101 HCHG RX REV CODE 250: Performed by: UROLOGY

## 2017-01-01 PROCEDURE — 80053 COMPREHEN METABOLIC PANEL: CPT

## 2017-01-01 PROCEDURE — 71010 DX-CHEST-PORTABLE (1 VIEW): CPT

## 2017-01-01 PROCEDURE — 94726 PLETHYSMOGRAPHY LUNG VOLUMES: CPT

## 2017-01-01 PROCEDURE — A9270 NON-COVERED ITEM OR SERVICE: HCPCS | Performed by: INTERNAL MEDICINE

## 2017-01-01 PROCEDURE — 99214 OFFICE O/P EST MOD 30 MIN: CPT | Performed by: NURSE PRACTITIONER

## 2017-01-01 PROCEDURE — 700111 HCHG RX REV CODE 636 W/ 250 OVERRIDE (IP): Performed by: UROLOGY

## 2017-01-01 PROCEDURE — 700102 HCHG RX REV CODE 250 W/ 637 OVERRIDE(OP): Performed by: INTERNAL MEDICINE

## 2017-01-01 PROCEDURE — 99214 OFFICE O/P EST MOD 30 MIN: CPT | Performed by: PHYSICIAN ASSISTANT

## 2017-01-01 PROCEDURE — 87186 SC STD MICRODIL/AGAR DIL: CPT

## 2017-01-01 PROCEDURE — 160039 HCHG SURGERY MINUTES - EA ADDL 1 MIN LEVEL 3: Performed by: UROLOGY

## 2017-01-01 PROCEDURE — 82962 GLUCOSE BLOOD TEST: CPT

## 2017-01-01 PROCEDURE — 88307 TISSUE EXAM BY PATHOLOGIST: CPT

## 2017-01-01 PROCEDURE — 0TBC8ZZ EXCISION OF BLADDER NECK, VIA NATURAL OR ARTIFICIAL OPENING ENDOSCOPIC: ICD-10-PCS | Performed by: UROLOGY

## 2017-01-01 PROCEDURE — 700101 HCHG RX REV CODE 250: Performed by: INTERNAL MEDICINE

## 2017-01-01 PROCEDURE — 85610 PROTHROMBIN TIME: CPT

## 2017-01-01 PROCEDURE — 87086 URINE CULTURE/COLONY COUNT: CPT

## 2017-01-01 PROCEDURE — A4357 BEDSIDE DRAINAGE BAG: HCPCS | Performed by: UROLOGY

## 2017-01-01 PROCEDURE — 700111 HCHG RX REV CODE 636 W/ 250 OVERRIDE (IP)

## 2017-01-01 PROCEDURE — 84478 ASSAY OF TRIGLYCERIDES: CPT

## 2017-01-01 PROCEDURE — 700105 HCHG RX REV CODE 258: Performed by: INTERNAL MEDICINE

## 2017-01-01 PROCEDURE — 500042 HCHG BAG, URINARY DRAINAGE (CLOSED): Performed by: UROLOGY

## 2017-01-01 PROCEDURE — 302214 INTUBATION BOX: Performed by: INTERNAL MEDICINE

## 2017-01-01 PROCEDURE — 770022 HCHG ROOM/CARE - ICU (200)

## 2017-01-01 PROCEDURE — 99153 MOD SED SAME PHYS/QHP EA: CPT | Performed by: UROLOGY

## 2017-01-01 PROCEDURE — 160023 HCHG SPINAL: Performed by: UROLOGY

## 2017-01-01 PROCEDURE — 71020 DX-CHEST-2 VIEWS: CPT | Mod: TC | Performed by: INTERNAL MEDICINE

## 2017-01-01 PROCEDURE — 700105 HCHG RX REV CODE 258

## 2017-01-01 PROCEDURE — 82962 GLUCOSE BLOOD TEST: CPT | Mod: 91

## 2017-01-01 PROCEDURE — 94060 EVALUATION OF WHEEZING: CPT | Mod: 26 | Performed by: INTERNAL MEDICINE

## 2017-01-01 PROCEDURE — 85730 THROMBOPLASTIN TIME PARTIAL: CPT

## 2017-01-01 PROCEDURE — 1036F TOBACCO NON-USER: CPT | Performed by: PHYSICIAN ASSISTANT

## 2017-01-01 PROCEDURE — 94002 VENT MGMT INPAT INIT DAY: CPT

## 2017-01-01 PROCEDURE — 87205 SMEAR GRAM STAIN: CPT

## 2017-01-01 PROCEDURE — 160046 HCHG PACU - 1ST 60 MINS PHASE II: Performed by: UROLOGY

## 2017-01-01 PROCEDURE — 82803 BLOOD GASES ANY COMBINATION: CPT

## 2017-01-01 PROCEDURE — 0BH18EZ INSERTION OF ENDOTRACHEAL AIRWAY INTO TRACHEA, VIA NATURAL OR ARTIFICIAL OPENING ENDOSCOPIC: ICD-10-PCS | Performed by: INTERNAL MEDICINE

## 2017-01-01 PROCEDURE — 94726 PLETHYSMOGRAPHY LUNG VOLUMES: CPT | Mod: 26 | Performed by: INTERNAL MEDICINE

## 2017-01-01 PROCEDURE — 160035 HCHG PACU - 1ST 60 MINS PHASE I: Performed by: UROLOGY

## 2017-01-01 PROCEDURE — 160048 HCHG OR STATISTICAL LEVEL 1-5: Performed by: UROLOGY

## 2017-01-01 PROCEDURE — 81002 URINALYSIS NONAUTO W/O SCOPE: CPT | Performed by: INTERNAL MEDICINE

## 2017-01-01 PROCEDURE — 96372 THER/PROPH/DIAG INJ SC/IM: CPT | Performed by: INTERNAL MEDICINE

## 2017-01-01 PROCEDURE — 85027 COMPLETE CBC AUTOMATED: CPT

## 2017-01-01 PROCEDURE — 4040F PNEUMOC VAC/ADMIN/RCVD: CPT | Performed by: PHYSICIAN ASSISTANT

## 2017-01-01 PROCEDURE — A9270 NON-COVERED ITEM OR SERVICE: HCPCS | Performed by: UROLOGY

## 2017-01-01 PROCEDURE — 74176 CT ABD & PELVIS W/O CONTRAST: CPT

## 2017-01-01 PROCEDURE — 94003 VENT MGMT INPAT SUBQ DAY: CPT

## 2017-01-01 PROCEDURE — 500354 HCHG CUTTING LOOP: Performed by: UROLOGY

## 2017-01-01 PROCEDURE — G8598 ASA/ANTIPLAT THER USED: HCPCS | Performed by: INTERNAL MEDICINE

## 2017-01-01 PROCEDURE — 96372 THER/PROPH/DIAG INJ SC/IM: CPT | Performed by: FAMILY MEDICINE

## 2017-01-01 PROCEDURE — G8419 CALC BMI OUT NRM PARAM NOF/U: HCPCS | Performed by: PHYSICIAN ASSISTANT

## 2017-01-01 PROCEDURE — G0378 HOSPITAL OBSERVATION PER HR: HCPCS

## 2017-01-01 PROCEDURE — 93306 TTE W/DOPPLER COMPLETE: CPT

## 2017-01-01 PROCEDURE — 3288F FALL RISK ASSESSMENT DOCD: CPT | Performed by: PHYSICIAN ASSISTANT

## 2017-01-01 PROCEDURE — 700117 HCHG RX CONTRAST REV CODE 255: Performed by: INTERNAL MEDICINE

## 2017-01-01 PROCEDURE — 500880 HCHG PACK, CYSTO W/SEP LEGGINGS: Performed by: UROLOGY

## 2017-01-01 PROCEDURE — 94729 DIFFUSING CAPACITY: CPT

## 2017-01-01 PROCEDURE — 81001 URINALYSIS AUTO W/SCOPE: CPT

## 2017-01-01 PROCEDURE — 84100 ASSAY OF PHOSPHORUS: CPT

## 2017-01-01 PROCEDURE — 94729 DIFFUSING CAPACITY: CPT | Mod: 26 | Performed by: INTERNAL MEDICINE

## 2017-01-01 PROCEDURE — G8598 ASA/ANTIPLAT THER USED: HCPCS | Performed by: PHYSICIAN ASSISTANT

## 2017-01-01 PROCEDURE — 99153 MOD SED SAME PHYS/QHP EA: CPT

## 2017-01-01 PROCEDURE — 83735 ASSAY OF MAGNESIUM: CPT

## 2017-01-01 PROCEDURE — 3288F FALL RISK ASSESSMENT DOCD: CPT | Performed by: INTERNAL MEDICINE

## 2017-01-01 PROCEDURE — 160003 HCHG RECOVERY MINUTES (EXTENDED) STAT: Performed by: UROLOGY

## 2017-01-01 PROCEDURE — 31500 INSERT EMERGENCY AIRWAY: CPT

## 2017-01-01 PROCEDURE — 83605 ASSAY OF LACTIC ACID: CPT

## 2017-01-01 PROCEDURE — 81002 URINALYSIS NONAUTO W/O SCOPE: CPT | Performed by: PHYSICIAN ASSISTANT

## 2017-01-01 PROCEDURE — A9270 NON-COVERED ITEM OR SERVICE: HCPCS

## 2017-01-01 PROCEDURE — C1751 CATH, INF, PER/CENT/MIDLINE: HCPCS

## 2017-01-01 PROCEDURE — G8432 DEP SCR NOT DOC, RNG: HCPCS | Performed by: INTERNAL MEDICINE

## 2017-01-01 PROCEDURE — 94060 EVALUATION OF WHEEZING: CPT

## 2017-01-01 PROCEDURE — 5A1945Z RESPIRATORY VENTILATION, 24-96 CONSECUTIVE HOURS: ICD-10-PCS | Performed by: INTERNAL MEDICINE

## 2017-01-01 PROCEDURE — G8432 DEP SCR NOT DOC, RNG: HCPCS | Performed by: PHYSICIAN ASSISTANT

## 2017-01-01 PROCEDURE — A6404 STERILE GAUZE > 48 SQ IN: HCPCS | Performed by: UROLOGY

## 2017-01-01 PROCEDURE — 99213 OFFICE O/P EST LOW 20 MIN: CPT | Performed by: INTERNAL MEDICINE

## 2017-01-01 PROCEDURE — 36556 INSERT NON-TUNNEL CV CATH: CPT

## 2017-01-01 PROCEDURE — 1036F TOBACCO NON-USER: CPT | Performed by: INTERNAL MEDICINE

## 2017-01-01 PROCEDURE — 02HV33Z INSERTION OF INFUSION DEVICE INTO SUPERIOR VENA CAVA, PERCUTANEOUS APPROACH: ICD-10-PCS | Performed by: INTERNAL MEDICINE

## 2017-01-01 PROCEDURE — 82803 BLOOD GASES ANY COMBINATION: CPT | Mod: 91

## 2017-01-01 PROCEDURE — 93005 ELECTROCARDIOGRAM TRACING: CPT

## 2017-01-01 PROCEDURE — 770001 HCHG ROOM/CARE - MED/SURG/GYN PRIV*

## 2017-01-01 PROCEDURE — 160036 HCHG PACU - EA ADDL 30 MINS PHASE I: Performed by: UROLOGY

## 2017-01-01 PROCEDURE — 160002 HCHG RECOVERY MINUTES (STAT): Performed by: UROLOGY

## 2017-01-01 PROCEDURE — 1100F PTFALLS ASSESS-DOCD GE2>/YR: CPT | Performed by: PHYSICIAN ASSISTANT

## 2017-01-01 PROCEDURE — 1100F PTFALLS ASSESS-DOCD GE2>/YR: CPT | Performed by: INTERNAL MEDICINE

## 2017-01-01 PROCEDURE — 160047 HCHG PACU  - EA ADDL 30 MINS PHASE II: Performed by: UROLOGY

## 2017-01-01 PROCEDURE — 51798 US URINE CAPACITY MEASURE: CPT

## 2017-01-01 PROCEDURE — 99152 MOD SED SAME PHYS/QHP 5/>YRS: CPT | Performed by: UROLOGY

## 2017-01-01 PROCEDURE — 88341 IMHCHEM/IMCYTCHM EA ADD ANTB: CPT

## 2017-01-01 PROCEDURE — 93306 TTE W/DOPPLER COMPLETE: CPT | Mod: 26 | Performed by: INTERNAL MEDICINE

## 2017-01-01 PROCEDURE — 700111 HCHG RX REV CODE 636 W/ 250 OVERRIDE (IP): Performed by: INTERNAL MEDICINE

## 2017-01-01 PROCEDURE — 88342 IMHCHEM/IMCYTCHM 1ST ANTB: CPT

## 2017-01-01 PROCEDURE — 85007 BL SMEAR W/DIFF WBC COUNT: CPT

## 2017-01-01 PROCEDURE — 501329 HCHG SET, CYSTO IRRIG Y TUR: Performed by: UROLOGY

## 2017-01-01 PROCEDURE — 700102 HCHG RX REV CODE 250 W/ 637 OVERRIDE(OP)

## 2017-01-01 PROCEDURE — 83880 ASSAY OF NATRIURETIC PEPTIDE: CPT

## 2017-01-01 PROCEDURE — 160025 RECOVERY II MINUTES (STATS): Performed by: UROLOGY

## 2017-01-01 PROCEDURE — 71275 CT ANGIOGRAPHY CHEST: CPT

## 2017-01-01 PROCEDURE — 0518F FALL PLAN OF CARE DOCD: CPT | Mod: 8P | Performed by: PHYSICIAN ASSISTANT

## 2017-01-01 PROCEDURE — 93005 ELECTROCARDIOGRAM TRACING: CPT | Performed by: UROLOGY

## 2017-01-01 RX ORDER — DUTASTERIDE 0.5 MG/1
0.5 CAPSULE, LIQUID FILLED ORAL DAILY
Qty: 90 CAP | Refills: 3 | Status: SHIPPED | OUTPATIENT
Start: 2017-01-01

## 2017-01-01 RX ORDER — AMOXICILLIN AND CLAVULANATE POTASSIUM 875; 125 MG/1; MG/1
1 TABLET, FILM COATED ORAL 2 TIMES DAILY WITH MEALS
Qty: 20 TAB | Refills: 2 | Status: SHIPPED
Start: 2017-01-01 | End: 2017-01-01

## 2017-01-01 RX ORDER — SODIUM CHLORIDE, SODIUM LACTATE, POTASSIUM CHLORIDE, CALCIUM CHLORIDE 600; 310; 30; 20 MG/100ML; MG/100ML; MG/100ML; MG/100ML
INJECTION, SOLUTION INTRAVENOUS CONTINUOUS
Status: DISCONTINUED | OUTPATIENT
Start: 2017-01-01 | End: 2017-01-01 | Stop reason: HOSPADM

## 2017-01-01 RX ORDER — CEFUROXIME AXETIL 250 MG/1
250 TABLET ORAL 2 TIMES DAILY
Qty: 20 TAB | Refills: 0 | Status: SHIPPED | OUTPATIENT
Start: 2017-01-01 | End: 2017-01-01

## 2017-01-01 RX ORDER — ETOMIDATE 2 MG/ML
20 INJECTION INTRAVENOUS ONCE
Status: COMPLETED | OUTPATIENT
Start: 2017-01-01 | End: 2017-01-01

## 2017-01-01 RX ORDER — MORPHINE SULFATE 4 MG/ML
4 INJECTION, SOLUTION INTRAMUSCULAR; INTRAVENOUS
Status: DISCONTINUED | OUTPATIENT
Start: 2017-01-01 | End: 2017-01-01

## 2017-01-01 RX ORDER — ACETAMINOPHEN 325 MG/1
650 TABLET ORAL EVERY 6 HOURS
Status: DISCONTINUED | OUTPATIENT
Start: 2017-01-01 | End: 2017-01-01 | Stop reason: HOSPADM

## 2017-01-01 RX ORDER — ALBUTEROL SULFATE 90 UG/1
2 AEROSOL, METERED RESPIRATORY (INHALATION) EVERY 6 HOURS PRN
Status: DISCONTINUED | OUTPATIENT
Start: 2017-01-01 | End: 2017-01-01

## 2017-01-01 RX ORDER — CEFTRIAXONE 1 G/1
1 INJECTION, POWDER, FOR SOLUTION INTRAMUSCULAR; INTRAVENOUS ONCE
Status: COMPLETED | OUTPATIENT
Start: 2017-01-01 | End: 2017-01-01

## 2017-01-01 RX ORDER — METHYLPREDNISOLONE 4 MG/1
TABLET ORAL
Qty: 21 TAB | Refills: 0 | Status: SHIPPED | OUTPATIENT
Start: 2017-01-01

## 2017-01-01 RX ORDER — OXYCODONE HYDROCHLORIDE 10 MG/1
5 TABLET ORAL
Status: DISCONTINUED | OUTPATIENT
Start: 2017-01-01 | End: 2017-01-01 | Stop reason: HOSPADM

## 2017-01-01 RX ORDER — SCOLOPAMINE TRANSDERMAL SYSTEM 1 MG/1
1 PATCH, EXTENDED RELEASE TRANSDERMAL
Status: DISCONTINUED | OUTPATIENT
Start: 2017-01-01 | End: 2017-11-05 | Stop reason: HOSPADM

## 2017-01-01 RX ORDER — LIDOCAINE HYDROCHLORIDE 10 MG/ML
0.5 INJECTION, SOLUTION INFILTRATION; PERINEURAL
Status: COMPLETED | OUTPATIENT
Start: 2017-01-01 | End: 2017-01-01

## 2017-01-01 RX ORDER — PHENAZOPYRIDINE HYDROCHLORIDE 100 MG/1
100 TABLET, FILM COATED ORAL 3 TIMES DAILY PRN
Qty: 9 TAB | Refills: 0 | Status: SHIPPED | OUTPATIENT
Start: 2017-01-01 | End: 2017-01-01 | Stop reason: SDUPTHER

## 2017-01-01 RX ORDER — LORAZEPAM 2 MG/ML
4 INJECTION INTRAMUSCULAR
Status: DISCONTINUED | OUTPATIENT
Start: 2017-01-01 | End: 2017-11-05 | Stop reason: HOSPADM

## 2017-01-01 RX ORDER — ONDANSETRON 2 MG/ML
4 INJECTION INTRAMUSCULAR; INTRAVENOUS EVERY 6 HOURS PRN
Status: DISCONTINUED | OUTPATIENT
Start: 2017-01-01 | End: 2017-01-01 | Stop reason: HOSPADM

## 2017-01-01 RX ORDER — ATROPINE SULFATE 10 MG/ML
2 SOLUTION/ DROPS OPHTHALMIC EVERY 4 HOURS PRN
Status: DISCONTINUED | OUTPATIENT
Start: 2017-01-01 | End: 2017-11-05 | Stop reason: HOSPADM

## 2017-01-01 RX ORDER — PROPOFOL 10 MG/ML
100 INJECTION, EMULSION INTRAVENOUS ONCE
Status: COMPLETED | OUTPATIENT
Start: 2017-01-01 | End: 2017-01-01

## 2017-01-01 RX ORDER — CEFDINIR 300 MG/1
300 CAPSULE ORAL 2 TIMES DAILY
Qty: 10 CAP | Refills: 0 | Status: SHIPPED | OUTPATIENT
Start: 2017-01-01 | End: 2017-01-01

## 2017-01-01 RX ORDER — SULFAMETHOXAZOLE AND TRIMETHOPRIM 800; 160 MG/1; MG/1
1 TABLET ORAL 2 TIMES DAILY
Qty: 20 TAB | Refills: 0 | Status: SHIPPED | OUTPATIENT
Start: 2017-01-01 | End: 2017-01-01

## 2017-01-01 RX ORDER — CHLORHEXIDINE GLUCONATE ORAL RINSE 1.2 MG/ML
15 SOLUTION DENTAL 2 TIMES DAILY
Status: DISCONTINUED | OUTPATIENT
Start: 2017-01-01 | End: 2017-01-01

## 2017-01-01 RX ORDER — SODIUM CHLORIDE, SODIUM LACTATE, POTASSIUM CHLORIDE, CALCIUM CHLORIDE 600; 310; 30; 20 MG/100ML; MG/100ML; MG/100ML; MG/100ML
INJECTION, SOLUTION INTRAVENOUS CONTINUOUS
Status: DISCONTINUED | OUTPATIENT
Start: 2017-01-01 | End: 2017-01-01

## 2017-01-01 RX ORDER — IPRATROPIUM BROMIDE AND ALBUTEROL SULFATE 2.5; .5 MG/3ML; MG/3ML
3 SOLUTION RESPIRATORY (INHALATION)
Status: DISCONTINUED | OUTPATIENT
Start: 2017-01-01 | End: 2017-01-01

## 2017-01-01 RX ORDER — ALBUTEROL SULFATE 2.5 MG/3ML
SOLUTION RESPIRATORY (INHALATION)
COMMUNITY
Start: 2016-01-01 | End: 2017-01-01

## 2017-01-01 RX ORDER — AMOXICILLIN 500 MG/1
500 CAPSULE ORAL 3 TIMES DAILY
Status: ON HOLD | COMMUNITY
End: 2017-01-01

## 2017-01-01 RX ORDER — TRIAMTERENE AND HYDROCHLOROTHIAZIDE 37.5; 25 MG/1; MG/1
1 TABLET ORAL DAILY
Qty: 30 TAB | Refills: 11 | Status: SHIPPED | OUTPATIENT
Start: 2017-01-01

## 2017-01-01 RX ORDER — SODIUM CHLORIDE, SODIUM LACTATE, POTASSIUM CHLORIDE, CALCIUM CHLORIDE 600; 310; 30; 20 MG/100ML; MG/100ML; MG/100ML; MG/100ML
1000 INJECTION, SOLUTION INTRAVENOUS ONCE
Status: COMPLETED | OUTPATIENT
Start: 2017-01-01 | End: 2017-01-01

## 2017-01-01 RX ORDER — AZITHROMYCIN 250 MG/1
TABLET, FILM COATED ORAL
Qty: 6 TAB | Refills: 0 | Status: SHIPPED | OUTPATIENT
Start: 2017-01-01 | End: 2017-01-01

## 2017-01-01 RX ORDER — SODIUM CHLORIDE 9 MG/ML
500 INJECTION, SOLUTION INTRAVENOUS ONCE
Status: COMPLETED | OUTPATIENT
Start: 2017-01-01 | End: 2017-01-01

## 2017-01-01 RX ORDER — LIDOCAINE HYDROCHLORIDE 10 MG/ML
INJECTION, SOLUTION INFILTRATION; PERINEURAL
Status: COMPLETED
Start: 2017-01-01 | End: 2017-01-01

## 2017-01-01 RX ORDER — LIDOCAINE HYDROCHLORIDE 10 MG/ML
1-2 INJECTION, SOLUTION INFILTRATION; PERINEURAL
Status: DISCONTINUED | OUTPATIENT
Start: 2017-01-01 | End: 2017-01-01

## 2017-01-01 RX ORDER — ONDANSETRON 2 MG/ML
4 INJECTION INTRAMUSCULAR; INTRAVENOUS EVERY 6 HOURS PRN
Status: DISCONTINUED | OUTPATIENT
Start: 2017-01-01 | End: 2017-01-01

## 2017-01-01 RX ORDER — POLYETHYLENE GLYCOL 3350 17 G/17G
1 POWDER, FOR SOLUTION ORAL
Status: DISCONTINUED | OUTPATIENT
Start: 2017-01-01 | End: 2017-01-01

## 2017-01-01 RX ORDER — MORPHINE SULFATE 4 MG/ML
4 INJECTION, SOLUTION INTRAMUSCULAR; INTRAVENOUS
Status: DISCONTINUED | OUTPATIENT
Start: 2017-01-01 | End: 2017-11-05 | Stop reason: HOSPADM

## 2017-01-01 RX ORDER — ATROPA BELLADONNA AND OPIUM 16.2; 6 MG/1; MG/1
30 SUPPOSITORY RECTAL EVERY 4 HOURS PRN
Status: DISCONTINUED | OUTPATIENT
Start: 2017-01-01 | End: 2017-01-01

## 2017-01-01 RX ORDER — OXYCODONE HYDROCHLORIDE 5 MG/1
2.5 TABLET ORAL
Status: DISCONTINUED | OUTPATIENT
Start: 2017-01-01 | End: 2017-01-01

## 2017-01-01 RX ORDER — BISACODYL 10 MG
10 SUPPOSITORY, RECTAL RECTAL
Status: DISCONTINUED | OUTPATIENT
Start: 2017-01-01 | End: 2017-01-01

## 2017-01-01 RX ORDER — ALBUTEROL SULFATE 90 UG/1
2 AEROSOL, METERED RESPIRATORY (INHALATION) EVERY 4 HOURS PRN
COMMUNITY
End: 2017-01-01 | Stop reason: SDUPTHER

## 2017-01-01 RX ORDER — CEFDINIR 300 MG/1
300 CAPSULE ORAL 2 TIMES DAILY
Qty: 28 CAP | Refills: 0 | Status: SHIPPED | OUTPATIENT
Start: 2017-01-01 | End: 2017-01-01 | Stop reason: SDUPTHER

## 2017-01-01 RX ORDER — MAGNESIUM SULFATE HEPTAHYDRATE 40 MG/ML
4 INJECTION, SOLUTION INTRAVENOUS ONCE
Status: COMPLETED | OUTPATIENT
Start: 2017-01-01 | End: 2017-01-01

## 2017-01-01 RX ORDER — ALBUTEROL SULFATE 90 UG/1
2 AEROSOL, METERED RESPIRATORY (INHALATION) EVERY 4 HOURS PRN
Qty: 8.5 G | Refills: 5 | Status: SHIPPED | OUTPATIENT
Start: 2017-01-01

## 2017-01-01 RX ORDER — SULFAMETHOXAZOLE AND TRIMETHOPRIM 800; 160 MG/1; MG/1
1 TABLET ORAL 2 TIMES DAILY
Qty: 14 TAB | Refills: 0 | Status: CANCELLED | OUTPATIENT
Start: 2017-01-01 | End: 2017-01-01

## 2017-01-01 RX ORDER — FUROSEMIDE 20 MG/1
TABLET ORAL
Qty: 30 TAB | Refills: 0 | Status: SHIPPED
Start: 2017-01-01 | End: 2017-01-01

## 2017-01-01 RX ORDER — FOLIC ACID 1 MG/1
2 TABLET ORAL EVERY EVENING
Qty: 180 TAB | Refills: 3 | Status: SHIPPED | OUTPATIENT
Start: 2017-01-01 | End: 2017-01-01 | Stop reason: SDUPTHER

## 2017-01-01 RX ORDER — LEVOFLOXACIN 250 MG/1
250 TABLET, FILM COATED ORAL DAILY
Qty: 10 TAB | Refills: 0 | Status: SHIPPED | OUTPATIENT
Start: 2017-01-01 | End: 2017-01-01

## 2017-01-01 RX ORDER — LIDOCAINE AND PRILOCAINE 25; 25 MG/G; MG/G
1 CREAM TOPICAL
Status: COMPLETED | OUTPATIENT
Start: 2017-01-01 | End: 2017-01-01

## 2017-01-01 RX ORDER — TIOTROPIUM BROMIDE 18 UG/1
1 CAPSULE ORAL; RESPIRATORY (INHALATION)
Status: DISCONTINUED | OUTPATIENT
Start: 2017-01-01 | End: 2017-01-01

## 2017-01-01 RX ORDER — FUROSEMIDE 10 MG/ML
10 INJECTION INTRAMUSCULAR; INTRAVENOUS
Status: DISCONTINUED | OUTPATIENT
Start: 2017-01-01 | End: 2017-01-01

## 2017-01-01 RX ORDER — ONDANSETRON 4 MG/1
8 TABLET, ORALLY DISINTEGRATING ORAL EVERY 8 HOURS PRN
Status: DISCONTINUED | OUTPATIENT
Start: 2017-01-01 | End: 2017-11-05 | Stop reason: HOSPADM

## 2017-01-01 RX ORDER — MIDAZOLAM HYDROCHLORIDE 1 MG/ML
INJECTION INTRAMUSCULAR; INTRAVENOUS
Status: COMPLETED
Start: 2017-01-01 | End: 2017-01-01

## 2017-01-01 RX ORDER — TIOTROPIUM BROMIDE 18 UG/1
1 CAPSULE ORAL; RESPIRATORY (INHALATION) DAILY
Status: DISCONTINUED | OUTPATIENT
Start: 2017-01-01 | End: 2017-01-01 | Stop reason: CLARIF

## 2017-01-01 RX ORDER — ACETAMINOPHEN 325 MG/1
650 TABLET ORAL EVERY 6 HOURS
Status: DISCONTINUED | OUTPATIENT
Start: 2017-01-01 | End: 2017-11-05 | Stop reason: HOSPADM

## 2017-01-01 RX ORDER — ALBUTEROL SULFATE 90 UG/1
2 AEROSOL, METERED RESPIRATORY (INHALATION) EVERY 4 HOURS PRN
Status: DISCONTINUED | OUTPATIENT
Start: 2017-01-01 | End: 2017-01-01

## 2017-01-01 RX ORDER — TERAZOSIN 5 MG/1
5 CAPSULE ORAL DAILY
Qty: 90 CAP | Refills: 3 | Status: SHIPPED | OUTPATIENT
Start: 2017-01-01

## 2017-01-01 RX ORDER — ACETAMINOPHEN 500 MG
500 TABLET ORAL
COMMUNITY

## 2017-01-01 RX ORDER — DEXTROSE MONOHYDRATE 25 G/50ML
25 INJECTION, SOLUTION INTRAVENOUS
Status: DISCONTINUED | OUTPATIENT
Start: 2017-01-01 | End: 2017-01-01

## 2017-01-01 RX ORDER — OXYCODONE HYDROCHLORIDE 10 MG/1
10 TABLET ORAL
Status: DISCONTINUED | OUTPATIENT
Start: 2017-01-01 | End: 2017-01-01 | Stop reason: HOSPADM

## 2017-01-01 RX ORDER — AMOXICILLIN 250 MG
2 CAPSULE ORAL 2 TIMES DAILY
Status: DISCONTINUED | OUTPATIENT
Start: 2017-01-01 | End: 2017-01-01

## 2017-01-01 RX ORDER — BUDESONIDE AND FORMOTEROL FUMARATE DIHYDRATE 160; 4.5 UG/1; UG/1
2 AEROSOL RESPIRATORY (INHALATION) 2 TIMES DAILY
Status: DISCONTINUED | OUTPATIENT
Start: 2017-01-01 | End: 2017-01-01

## 2017-01-01 RX ORDER — CEPHALEXIN 500 MG/1
500 CAPSULE ORAL 3 TIMES DAILY
Qty: 21 CAP | Refills: 0 | Status: SHIPPED | OUTPATIENT
Start: 2017-01-01 | End: 2017-01-01

## 2017-01-01 RX ORDER — GLYCOPYRROLATE 1 MG/1
1 TABLET ORAL 3 TIMES DAILY PRN
Status: DISCONTINUED | OUTPATIENT
Start: 2017-01-01 | End: 2017-11-05 | Stop reason: HOSPADM

## 2017-01-01 RX ORDER — PHENAZOPYRIDINE HYDROCHLORIDE 100 MG/1
100 TABLET, FILM COATED ORAL 3 TIMES DAILY PRN
Qty: 15 TAB | Refills: 0 | Status: SHIPPED | OUTPATIENT
Start: 2017-01-01 | End: 2017-01-01

## 2017-01-01 RX ORDER — TRIAMTERENE AND HYDROCHLOROTHIAZIDE 37.5; 25 MG/1; MG/1
1 TABLET ORAL DAILY
Status: DISCONTINUED | OUTPATIENT
Start: 2017-01-01 | End: 2017-01-01

## 2017-01-01 RX ORDER — PREDNISONE 10 MG/1
TABLET ORAL
Qty: 18 TAB | Refills: 0 | Status: SHIPPED | OUTPATIENT
Start: 2017-01-01 | End: 2017-01-01

## 2017-01-01 RX ORDER — OXYCODONE HYDROCHLORIDE 5 MG/1
5 TABLET ORAL
Status: DISCONTINUED | OUTPATIENT
Start: 2017-01-01 | End: 2017-01-01

## 2017-01-01 RX ORDER — PHENAZOPYRIDINE HYDROCHLORIDE 200 MG/1
200 TABLET, FILM COATED ORAL 2 TIMES DAILY
Qty: 60 TAB | Refills: 0 | Status: SHIPPED | OUTPATIENT
Start: 2017-01-01 | End: 2017-01-01

## 2017-01-01 RX ORDER — TRIAMTERENE AND HYDROCHLOROTHIAZIDE 37.5; 25 MG/1; MG/1
1 TABLET ORAL DAILY
Qty: 30 TAB | Refills: 3 | Status: SHIPPED | OUTPATIENT
Start: 2017-01-01 | End: 2017-01-01 | Stop reason: SDUPTHER

## 2017-01-01 RX ORDER — PREDNISONE 10 MG/1
TABLET ORAL
Qty: 30 TAB | Refills: 2 | Status: SHIPPED
Start: 2017-01-01 | End: 2017-01-01

## 2017-01-01 RX ORDER — FOLIC ACID 1 MG/1
2 TABLET ORAL EVERY EVENING
Qty: 180 TAB | Refills: 3 | Status: SHIPPED | OUTPATIENT
Start: 2017-01-01

## 2017-01-01 RX ORDER — SUCCINYLCHOLINE CHLORIDE 20 MG/ML
100 INJECTION INTRAMUSCULAR; INTRAVENOUS ONCE
Status: COMPLETED | OUTPATIENT
Start: 2017-01-01 | End: 2017-01-01

## 2017-01-01 RX ORDER — TERAZOSIN 5 MG/1
5 CAPSULE ORAL DAILY
Status: DISCONTINUED | OUTPATIENT
Start: 2017-01-01 | End: 2017-01-01

## 2017-01-01 RX ORDER — ONDANSETRON 2 MG/ML
8 INJECTION INTRAMUSCULAR; INTRAVENOUS EVERY 8 HOURS PRN
Status: DISCONTINUED | OUTPATIENT
Start: 2017-01-01 | End: 2017-11-05 | Stop reason: HOSPADM

## 2017-01-01 RX ADMIN — ALBUTEROL SULFATE 2.5 MG: 2.5 SOLUTION RESPIRATORY (INHALATION) at 06:23

## 2017-01-01 RX ADMIN — LIDOCAINE HYDROCHLORIDE 0.5 ML: 10 INJECTION, SOLUTION INFILTRATION; PERINEURAL at 17:30

## 2017-01-01 RX ADMIN — CEFEPIME 2 G: 2 INJECTION, POWDER, FOR SOLUTION INTRAMUSCULAR; INTRAVENOUS at 20:24

## 2017-01-01 RX ADMIN — MORPHINE SULFATE 4 MG: 4 INJECTION INTRAVENOUS at 11:24

## 2017-01-01 RX ADMIN — SODIUM CHLORIDE, POTASSIUM CHLORIDE, SODIUM LACTATE AND CALCIUM CHLORIDE: 600; 310; 30; 20 INJECTION, SOLUTION INTRAVENOUS at 07:44

## 2017-01-01 RX ADMIN — STANDARDIZED SENNA CONCENTRATE AND DOCUSATE SODIUM 2 TABLET: 8.6; 5 TABLET, FILM COATED ORAL at 08:40

## 2017-01-01 RX ADMIN — CEFEPIME 2 G: 2 INJECTION, POWDER, FOR SOLUTION INTRAMUSCULAR; INTRAVENOUS at 09:00

## 2017-01-01 RX ADMIN — IPRATROPIUM BROMIDE AND ALBUTEROL SULFATE 3 ML: .5; 3 SOLUTION RESPIRATORY (INHALATION) at 15:00

## 2017-01-01 RX ADMIN — PROPOFOL 100 MG: 10 INJECTION, EMULSION INTRAVENOUS at 10:15

## 2017-01-01 RX ADMIN — STANDARDIZED SENNA CONCENTRATE AND DOCUSATE SODIUM 2 TABLET: 8.6; 5 TABLET, FILM COATED ORAL at 20:24

## 2017-01-01 RX ADMIN — VANCOMYCIN HYDROCHLORIDE 900 MG: 100 INJECTION, POWDER, LYOPHILIZED, FOR SOLUTION INTRAVENOUS at 03:22

## 2017-01-01 RX ADMIN — PROPOFOL 20 MCG/KG/MIN: 10 INJECTION, EMULSION INTRAVENOUS at 16:40

## 2017-01-01 RX ADMIN — TRIAMTERENE AND HYDROCHLOROTHIAZIDE 1 TABLET: 37.5; 25 TABLET ORAL at 08:45

## 2017-01-01 RX ADMIN — TIOTROPIUM BROMIDE 1 CAPSULE: 18 CAPSULE ORAL; RESPIRATORY (INHALATION) at 06:36

## 2017-01-01 RX ADMIN — Medication 50 MCG/HR: at 03:10

## 2017-01-01 RX ADMIN — Medication 50 MG: at 12:45

## 2017-01-01 RX ADMIN — MORPHINE SULFATE 4 MG: 4 INJECTION INTRAVENOUS at 10:14

## 2017-01-01 RX ADMIN — LORAZEPAM 4 MG: 2 INJECTION INTRAMUSCULAR; INTRAVENOUS at 10:14

## 2017-01-01 RX ADMIN — LORAZEPAM 2 MG: 2 INJECTION INTRAMUSCULAR; INTRAVENOUS at 16:31

## 2017-01-01 RX ADMIN — Medication 50 MCG/HR: at 13:28

## 2017-01-01 RX ADMIN — FUROSEMIDE 10 MG: 10 INJECTION, SOLUTION INTRAMUSCULAR; INTRAVENOUS at 07:11

## 2017-01-01 RX ADMIN — SUCCINYLCHOLINE CHLORIDE 100 MG: 20 INJECTION, SOLUTION INTRAMUSCULAR; INTRAVENOUS at 10:15

## 2017-01-01 RX ADMIN — IPRATROPIUM BROMIDE AND ALBUTEROL SULFATE 3 ML: .5; 3 SOLUTION RESPIRATORY (INHALATION) at 21:56

## 2017-01-01 RX ADMIN — ATROPINE SULFATE 2 DROP: 10 SOLUTION/ DROPS OPHTHALMIC at 11:53

## 2017-01-01 RX ADMIN — CEFTRIAXONE 1 G: 1 INJECTION, POWDER, FOR SOLUTION INTRAMUSCULAR; INTRAVENOUS at 14:05

## 2017-01-01 RX ADMIN — SODIUM CHLORIDE, POTASSIUM CHLORIDE, SODIUM LACTATE AND CALCIUM CHLORIDE: 600; 310; 30; 20 INJECTION, SOLUTION INTRAVENOUS at 03:00

## 2017-01-01 RX ADMIN — PROPOFOL 30 MCG/KG/MIN: 10 INJECTION, EMULSION INTRAVENOUS at 12:00

## 2017-01-01 RX ADMIN — CEFTRIAXONE 1 G: 1 INJECTION, POWDER, FOR SOLUTION INTRAMUSCULAR; INTRAVENOUS at 11:01

## 2017-01-01 RX ADMIN — IPRATROPIUM BROMIDE AND ALBUTEROL SULFATE 3 ML: .5; 3 SOLUTION RESPIRATORY (INHALATION) at 02:05

## 2017-01-01 RX ADMIN — SODIUM CHLORIDE, SODIUM LACTATE, POTASSIUM CHLORIDE, CALCIUM CHLORIDE: 600; 310; 30; 20 INJECTION, SOLUTION INTRAVENOUS at 17:15

## 2017-01-01 RX ADMIN — ACETAMINOPHEN 650 MG: 325 TABLET, FILM COATED ORAL at 05:25

## 2017-01-01 RX ADMIN — SODIUM CHLORIDE, POTASSIUM CHLORIDE, SODIUM LACTATE AND CALCIUM CHLORIDE: 600; 310; 30; 20 INJECTION, SOLUTION INTRAVENOUS at 20:26

## 2017-01-01 RX ADMIN — IPRATROPIUM BROMIDE AND ALBUTEROL SULFATE 3 ML: .5; 3 SOLUTION RESPIRATORY (INHALATION) at 18:45

## 2017-01-01 RX ADMIN — IPRATROPIUM BROMIDE AND ALBUTEROL SULFATE 3 ML: .5; 3 SOLUTION RESPIRATORY (INHALATION) at 14:47

## 2017-01-01 RX ADMIN — TERAZOSIN HYDROCHLORIDE ANHYDROUS 5 MG: 5 CAPSULE ORAL at 08:45

## 2017-01-01 RX ADMIN — SODIUM CHLORIDE, POTASSIUM CHLORIDE, SODIUM LACTATE AND CALCIUM CHLORIDE: 600; 310; 30; 20 INJECTION, SOLUTION INTRAVENOUS at 01:22

## 2017-01-01 RX ADMIN — FENTANYL CITRATE 100 MCG: 50 INJECTION, SOLUTION INTRAMUSCULAR; INTRAVENOUS at 10:15

## 2017-01-01 RX ADMIN — IPRATROPIUM BROMIDE AND ALBUTEROL SULFATE 3 ML: .5; 3 SOLUTION RESPIRATORY (INHALATION) at 11:19

## 2017-01-01 RX ADMIN — IPRATROPIUM BROMIDE AND ALBUTEROL SULFATE 3 ML: .5; 3 SOLUTION RESPIRATORY (INHALATION) at 10:43

## 2017-01-01 RX ADMIN — PROPOFOL 20 MCG/KG/MIN: 10 INJECTION, EMULSION INTRAVENOUS at 03:12

## 2017-01-01 RX ADMIN — IPRATROPIUM BROMIDE AND ALBUTEROL SULFATE 3 ML: .5; 3 SOLUTION RESPIRATORY (INHALATION) at 22:52

## 2017-01-01 RX ADMIN — ALBUTEROL SULFATE 2.5 MG: 2.5 SOLUTION RESPIRATORY (INHALATION) at 21:51

## 2017-01-01 RX ADMIN — CHLORHEXIDINE GLUCONATE 15 ML: 1.2 RINSE ORAL at 20:24

## 2017-01-01 RX ADMIN — POTASSIUM PHOSPHATE, MONOBASIC AND POTASSIUM PHOSPHATE, DIBASIC 15 MMOL: 224; 236 INJECTION, SOLUTION INTRAVENOUS at 07:44

## 2017-01-01 RX ADMIN — SCOLOPAMINE TRANSDERMAL SYSTEM 1 PATCH: 1 PATCH, EXTENDED RELEASE TRANSDERMAL at 10:13

## 2017-01-01 RX ADMIN — CHLORHEXIDINE GLUCONATE 15 ML: 1.2 RINSE ORAL at 08:40

## 2017-01-01 RX ADMIN — CHLORHEXIDINE GLUCONATE 15 ML: 1.2 RINSE ORAL at 12:11

## 2017-01-01 RX ADMIN — CEFEPIME 2 G: 2 INJECTION, POWDER, FOR SOLUTION INTRAMUSCULAR; INTRAVENOUS at 12:07

## 2017-01-01 RX ADMIN — CEFEPIME 2 G: 2 INJECTION, POWDER, FOR SOLUTION INTRAMUSCULAR; INTRAVENOUS at 08:11

## 2017-01-01 RX ADMIN — LIDOCAINE HYDROCHLORIDE 0.5 ML: 10 INJECTION, SOLUTION INFILTRATION; PERINEURAL at 12:50

## 2017-01-01 RX ADMIN — PROPOFOL 10 MCG/KG/MIN: 10 INJECTION, EMULSION INTRAVENOUS at 11:56

## 2017-01-01 RX ADMIN — VANCOMYCIN HYDROCHLORIDE 900 MG: 100 INJECTION, POWDER, LYOPHILIZED, FOR SOLUTION INTRAVENOUS at 11:57

## 2017-01-01 RX ADMIN — CEFEPIME 2 G: 2 INJECTION, POWDER, FOR SOLUTION INTRAMUSCULAR; INTRAVENOUS at 21:37

## 2017-01-01 RX ADMIN — STANDARDIZED SENNA CONCENTRATE AND DOCUSATE SODIUM 2 TABLET: 8.6; 5 TABLET, FILM COATED ORAL at 21:36

## 2017-01-01 RX ADMIN — ALBUTEROL SULFATE 2.5 MG: 2.5 SOLUTION RESPIRATORY (INHALATION) at 16:00

## 2017-01-01 RX ADMIN — MIDAZOLAM 2 MG: 1 INJECTION INTRAMUSCULAR; INTRAVENOUS at 10:10

## 2017-01-01 RX ADMIN — SODIUM CHLORIDE, SODIUM LACTATE, POTASSIUM CHLORIDE, CALCIUM CHLORIDE: 600; 310; 30; 20 INJECTION, SOLUTION INTRAVENOUS at 12:35

## 2017-01-01 RX ADMIN — IPRATROPIUM BROMIDE AND ALBUTEROL SULFATE 3 ML: .5; 3 SOLUTION RESPIRATORY (INHALATION) at 06:26

## 2017-01-01 RX ADMIN — MAGNESIUM SULFATE IN WATER 4 G: 40 INJECTION, SOLUTION INTRAVENOUS at 07:43

## 2017-01-01 RX ADMIN — CEFTRIAXONE 1 G: 1 INJECTION, POWDER, FOR SOLUTION INTRAMUSCULAR; INTRAVENOUS at 14:18

## 2017-01-01 RX ADMIN — IPRATROPIUM BROMIDE AND ALBUTEROL SULFATE 3 ML: .5; 3 SOLUTION RESPIRATORY (INHALATION) at 19:47

## 2017-01-01 RX ADMIN — MORPHINE SULFATE 4 MG: 4 INJECTION INTRAVENOUS at 11:53

## 2017-01-01 RX ADMIN — MORPHINE SULFATE 4 MG: 4 INJECTION INTRAVENOUS at 11:00

## 2017-01-01 RX ADMIN — STANDARDIZED SENNA CONCENTRATE AND DOCUSATE SODIUM 2 TABLET: 8.6; 5 TABLET, FILM COATED ORAL at 08:11

## 2017-01-01 RX ADMIN — CHLORHEXIDINE GLUCONATE 15 ML: 1.2 RINSE ORAL at 08:11

## 2017-01-01 RX ADMIN — IPRATROPIUM BROMIDE AND ALBUTEROL SULFATE 3 ML: .5; 3 SOLUTION RESPIRATORY (INHALATION) at 03:10

## 2017-01-01 RX ADMIN — VANCOMYCIN HYDROCHLORIDE 900 MG: 100 INJECTION, POWDER, LYOPHILIZED, FOR SOLUTION INTRAVENOUS at 23:39

## 2017-01-01 RX ADMIN — ETOMIDATE 20 MG: 2 INJECTION INTRAVENOUS at 10:15

## 2017-01-01 RX ADMIN — IOHEXOL 75 ML: 350 INJECTION, SOLUTION INTRAVENOUS at 13:30

## 2017-01-01 RX ADMIN — OMEPRAZOLE 40 MG: 20 CAPSULE, DELAYED RELEASE ORAL at 08:11

## 2017-01-01 RX ADMIN — BUDESONIDE AND FORMOTEROL FUMARATE DIHYDRATE 2 PUFF: 160; 4.5 AEROSOL RESPIRATORY (INHALATION) at 08:45

## 2017-01-01 RX ADMIN — CHLORHEXIDINE GLUCONATE 15 ML: 1.2 RINSE ORAL at 21:36

## 2017-01-01 RX ADMIN — SODIUM CHLORIDE, POTASSIUM CHLORIDE, SODIUM LACTATE AND CALCIUM CHLORIDE 1000 ML: 600; 310; 30; 20 INJECTION, SOLUTION INTRAVENOUS at 11:22

## 2017-01-01 RX ADMIN — SODIUM CHLORIDE 500 ML: 9 INJECTION, SOLUTION INTRAVENOUS at 03:23

## 2017-01-01 RX ADMIN — FENTANYL CITRATE 100 MCG: 50 INJECTION, SOLUTION INTRAMUSCULAR; INTRAVENOUS at 10:45

## 2017-01-01 RX ADMIN — VANCOMYCIN HYDROCHLORIDE 1700 MG: 100 INJECTION, POWDER, LYOPHILIZED, FOR SOLUTION INTRAVENOUS at 12:12

## 2017-01-01 RX ADMIN — ALBUTEROL SULFATE: 2.5 SOLUTION RESPIRATORY (INHALATION) at 19:10

## 2017-01-01 RX ADMIN — ACETAMINOPHEN 650 MG: 325 TABLET, FILM COATED ORAL at 23:38

## 2017-01-01 RX ADMIN — OMEPRAZOLE 40 MG: 20 CAPSULE, DELAYED RELEASE ORAL at 08:40

## 2017-01-01 RX ADMIN — IPRATROPIUM BROMIDE AND ALBUTEROL SULFATE 3 ML: .5; 3 SOLUTION RESPIRATORY (INHALATION) at 06:29

## 2017-01-01 RX ADMIN — SODIUM CHLORIDE, SODIUM LACTATE, POTASSIUM CHLORIDE, CALCIUM CHLORIDE: 600; 310; 30; 20 INJECTION, SOLUTION INTRAVENOUS at 12:55

## 2017-01-01 ASSESSMENT — PAIN SCALES - GENERAL
PAINLEVEL_OUTOF10: 0

## 2017-01-01 ASSESSMENT — PULMONARY FUNCTION TESTS
FVC_PERCENT_PREDICTED: 66
FEV1: 1.23
FEV1/FVC: 51
FVC_PERCENT_PREDICTED: 74
FEV1_PERCENT_PREDICTED: 55
FEV1_PREDICTED: 2.24
FEV1_PERCENT_CHANGE: 11
FEV1_PERCENT_PREDICTED: 48
FVC: 2.11
FEV1/FVC_PERCENT_PREDICTED: 74
FEV1/FVC: 52.34
FEV1/FVC_PERCENT_CHANGE: 127
FEV1/FVC_PERCENT_PREDICTED: 73
FEV1_PERCENT_CHANGE: 14
FEV1: 1.08
EPAP_CMH2O: 8
EPAP_CMH2O: 8
FVC: 2.35
FEV1/FVC_PERCENT_PREDICTED: 70
FVC_PREDICTED: 3.19

## 2017-01-01 ASSESSMENT — ENCOUNTER SYMPTOMS
ABDOMINAL PAIN: 0
FEVER: 0
PND: 0
FLANK PAIN: 0
HEARTBURN: 0
BRUISES/BLEEDS EASILY: 0
DIARRHEA: 0
SPUTUM PRODUCTION: 0
VOMITING: 0
MYALGIAS: 0
PALPITATIONS: 0
NERVOUS/ANXIOUS: 0
DIZZINESS: 0
CHILLS: 0
WHEEZING: 0
MUSCULOSKELETAL NEGATIVE: 1
NAUSEA: 0
DEPRESSION: 0
NAUSEA: 0
FEVER: 0
COUGH: 0
SHORTNESS OF BREATH: 1
HEADACHES: 0
CHILLS: 0
BLURRED VISION: 0
SHORTNESS OF BREATH: 1
DIZZINESS: 0
EYE DISCHARGE: 0
HEADACHES: 0

## 2017-01-01 ASSESSMENT — LIFESTYLE VARIABLES
REASON UNABLE TO ASSESS: INTUBATED
ALCOHOL_USE: NO
EVER_SMOKED: YES
EVER_SMOKED: YES

## 2017-01-01 ASSESSMENT — COPD QUESTIONNAIRES
HAVE YOU SMOKED AT LEAST 100 CIGARETTES IN YOUR ENTIRE LIFE: YES
DO YOU EVER COUGH UP ANY MUCUS OR PHLEGM?: YES, A FEW DAYS A WEEK OR MONTH
DURING THE PAST 4 WEEKS HOW MUCH DID YOU FEEL SHORT OF BREATH: SOME OF THE TIME
COPD SCREENING SCORE: 8

## 2017-01-20 NOTE — PATIENT INSTRUCTIONS
1. We have prescribed Maxzide diuretic for leg edema. Recommend not taking more than 2 or 3 Maxide pills per week.  2. We have referred to pulmonary rehabilitation  3. Recommend continuing with Spiriva, Advair and oxygen  4. Recommend follow-up in 6 months

## 2017-01-20 NOTE — MR AVS SNAPSHOT
"Meng Snyder   2017 2:00 PM   Office Visit   MRN: [id number]    Department:  [facility]   Dept Phone:  [phone]    Description:  Male : 1935   Provider:  Sebas Johnson M.D.           Reason for Visit     Follow-Up           Allergies as of 2017     No Known Allergies      Vital Signs     Blood Pressure Pulse Temperature Respirations Height Weight    140/86 mmHg 89 36.6 °C (97.9 °F) 16 1.702 m (5' 7.01\") 77.111 kg (170 lb)    Body Mass Index Smoking Status                26.62 kg/m2 Former Smoker          Basic Information     Date Of Birth Sex Race Ethnicity Preferred Language    1935 Male White Non- English      Your appointments     2017 10:30 AM   FOLLOW UP with Vincent Dumont M.D.   [facility] (--)    [address]  [facility] [address] [phone]              Problem List              ICD-10-CM Priority Class Noted - Resolved    HTN (hypertension), benign (Chronic) I10 Low  2012 - Present    Family history of colon cancer Z80.0   2012 - Present    Rheumatoid arthritis (CMS-HCC) (Chronic) M06.9 Low  2012 - Present    BPH (benign prostatic hyperplasia) (Chronic) N40.0 Low  6/3/2013 - Present    COPD (chronic obstructive pulmonary disease) (CMS-HCC) J44.9 Low  2014 - Present    Other chronic pulmonary heart diseases (CMS-HCC) I27.89 High  2015 - Present    Chronic respiratory failure (CMS-HCC) J96.10 Low  2015 - Present    Tremor R25.1   2015 - Present    Headache R51   2016 - Present    Mass of lower lobe of left lung R91.8   2016 - Present    CVA (cerebral vascular accident) (CMS-HCC) I63.9 High  2016 - Present    Atrial fibrillation (CMS-HCC) I48.91 Medium  2016 - Present    CKD (chronic kidney disease), 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 Low  2016 - Present    Dyslipidemia E78.5 Low  2016 - Present    Late effect of stroke I69.30   " 9/8/2016 - Present      Health Maintenance        Date Due Completion Dates    IMM ZOSTER VACCINE 9/17/1997 ---    COLONOSCOPY 5/8/2016 5/8/2011 (Done)    Override on 5/8/2011: Done    IMM INFLUENZA (1) 9/1/2016 10/1/2015, 9/26/2014, 10/8/2011    IMM DTaP/Tdap/Td Vaccine (2 - Td) 5/8/2022 5/8/2012            Current Immunizations     13-VALENT PCV PREVNAR 9/19/2016 11:18 AM    Hepatitis B Vaccine Non-Recombivax (Ped/Adol) 11/19/2012    Influenza TIV (IM) 10/1/2016, 10/1/2015, 9/26/2014    Pneumococcal polysaccharide vaccine (PPSV-23) 12/1/2014    Tdap Vaccine 5/8/2012      Below and/or attached are the medications your provider expects you to take. Review all of your home medications and newly ordered medications with your provider and/or pharmacist. Follow medication instructions as directed by your provider and/or pharmacist. Please keep your medication list with you and share with your provider. Update the information when medications are discontinued, doses are changed, or new medications (including over-the-counter products) are added; and carry medication information at all times in the event of emergency situations     Allergies:  No Known Allergies          Medications  Valid as of: January 20, 2017 -  2:28 PM    Generic Name Brand Name Tablet Size Instructions for use    Albuterol Sulfate (Aero Soln) albuterol 108 (90 BASE) MCG/ACT Inhale 2 Puffs by mouth every 6 hours as needed. Indications: Asthma        Albuterol Sulfate (Nebu Soln) PROVENTIL 2.5mg/0.5ml 0.5 mL by Nebulization route 2 Times a Day.        Albuterol Sulfate (Nebu Soln) PROVENTIL 2.5mg/3ml         AmLODIPine Besylate (Tab) NORVASC 5 MG Take 1 Tab by mouth every bedtime.        Apixaban (Tab) ELIQUIS 5mg Take 1 Tab by mouth 2 Times a Day.        Atorvastatin Calcium (Tab) LIPITOR 40 MG Take 1 Tab by mouth every bedtime.        Cholecalciferol (Tab) cholecalciferol 1000 UNIT Take 2,000 Units by mouth every evening.        Dutasteride (Cap)  AVODART 0.5 MG Take 1 Cap by mouth every evening.        Fluticasone-Salmeterol (AEROSOL POWDER, BREATH ACTIVATED) ADVAIR 500-50 MCG/DOSE Inhale 1 Puff by mouth every 12 hours.        Folic Acid (Tab) FOLVITE 1 MG Take 2 mg by mouth every evening.        Methotrexate Sodium (Tab) methotrexate 2.5 MG Take 2 Tabs by mouth every 7 days. Unknown what day he takes this.        MethylPREDNISolone (Tab) MEDROL 4 MG Take 4 mg by mouth every evening. COPD - longer term treatment        MethylPREDNISolone (Tab) MEDROL 4 MG Take 1 Tab by mouth every day.        Multiple Vitamins-Minerals   Take 1 Tab by mouth every evening.        Terazosin HCl (Cap) HYTRIN 5 MG Take 1 Cap by mouth every day.        Tiotropium Bromide Monohydrate (Cap) SPIRIVA 18 MCG Inhale 1 Cap by mouth every day.        .                 Medicines prescribed today were sent to:     [id number] - [place], [place] - [address]    [address] [facility] [place] [address]    Phone: [phone] Fax: [phone]    Open 24 Hours?: No    [facility]    [address] [address] [place] [address]    Phone: [phone] Fax: [phone]      Medication refill instructions:       If your prescription bottle indicates you have medication refills left, it is not necessary to call your provider’s office. Please contact your pharmacy and they will refill your medication.    If your prescription bottle indicates you do not have any refills left, you may request refills at any time through one of the following ways: The online [employer]Box system (except Urgent Care), by calling your provider’s office, or by asking your pharmacy to contact your provider’s office with a refill request. Medication refills are processed only during regular business hours and may not be available until the next business day. Your provider may request additional information or to have a follow-up visit with you prior to refilling your medication.   *Please Note: Medication refills are  assigned a new Rx number when refilled electronically. Your pharmacy may indicate that no refills were authorized even though a new prescription for the same medication is available at the pharmacy. Please request the medicine by name with the pharmacy before contacting your provider for a refill.        Other Notes About Your Plan     Patient Enrolled in Medical Home with Dr. Gil.   Please assess and status chronic disease from prior visits.    No queries.           [employer] Access Code: Activation code not generated  Current My Digital Shield Status: Active

## 2017-01-20 NOTE — PROGRESS NOTES
Meng Snyder is a 81 y.o. male here for follow-up of COPD.    History of Present Illness:    The patient is a 81-year-old male comes in for follow-up. He has COPD with an FEV1 which is 45% predicted with a diffusion capacity test of 64% predicted. He also has a large left lung mass over 8 cm. It has been observed since 2005 and is very slowly increasing in size. He said several biopsies which have been nondiagnostic. He is currently on chronic anticoagulation for atrial fibrillation. He is on oxygen 24 hours a day and he uses Advair and Spiriva inhalers. He is on methotrexate for rheumatoid arthritis. He denies any new complaints today. He does admit to having slowly progressive shortness of breath. He denies any congestion or purulent sputum production. He has no chest pains or pleurisy. He is up-to-date with his vaccines. He quit smoking back in 2004.    Constitutional:  Negative for fever, chills, sweats, and fatigue.  Eyes:  Negative for eye pain and visual changes.  HENT:  Negative for tinnitus and hoarse voice.  Cardiovascular:  Negative for chest pain, leg swelling, syncope and orthopnea.  Respiratory:  See HPI for pertinent negatives  Sleep:  Negative for somnolence, loud snoring, sleep disturbance due to breathing, insomnia.  Gastrointestinal:  Negative for dysphagia, nausea and abdominal pain.  Heme/lymph:  Denies easy bruising, blood clots.  Musculoskeletal:  Negative for arthralgias, sore muscles and back pain.  Skin:  Negative for rash and color change.  Neurological:  Negative for headaches, lightheadedness and weakness.  Psychiatric:  Denies depression.    Current Outpatient Prescriptions   Medication Sig Dispense Refill   • triamterene-hctz (MAXZIDE-25/DYAZIDE) 37.5-25 MG Tab Take 1 Tab by mouth every day. 30 Tab 3   • albuterol (PROVENTIL) 2.5mg/3ml Nebu Soln solution for nebulization      • albuterol (PROVENTIL) 2.5mg/0.5ml Nebu Soln 0.5 mL by Nebulization route 2 Times a Day. 375 mL 5   •  "methotrexate 2.5 MG Tab Take 2 Tabs by mouth every 7 days. Unknown what day he takes this. 10 Tab 0   • dutasteride (AVODART) 0.5 MG capsule Take 1 Cap by mouth every evening. 90 Cap 3   • terazosin (HYTRIN) 5 MG Cap Take 1 Cap by mouth every day. 30 Cap 11   • methylPREDNISolone (MEDROL) 4 MG Tab Take 1 Tab by mouth every day. 90 Tab 3   • tiotropium (SPIRIVA) 18 MCG Cap Inhale 1 Cap by mouth every day. 90 Cap 3   • apixaban (ELIQUIS) 5mg Tab Take 1 Tab by mouth 2 Times a Day. 60 Tab 3   • albuterol 108 (90 BASE) MCG/ACT Aero Soln inhalation aerosol Inhale 2 Puffs by mouth every 6 hours as needed. Indications: Asthma 8.5 g 5   • atorvastatin (LIPITOR) 40 MG Tab Take 1 Tab by mouth every bedtime. (Patient not taking: Reported on 12/20/2016) 30 Tab 6   • folic acid (FOLVITE) 1 MG Tab Take 2 mg by mouth every evening.     • methylPREDNISolone (MEDROL) 4 MG Tab Take 4 mg by mouth every evening. COPD - longer term treatment     • fluticasone-salmeterol (ADVAIR DISKUS) 500-50 MCG/DOSE AEROSOL POWDER, BREATH ACTIVATED Inhale 1 Puff by mouth every 12 hours. 1 Inhaler 5   • amlodipine (NORVASC) 5 MG Tab Take 1 Tab by mouth every bedtime. 30 Tab 11   • Multiple Vitamins-Minerals (MULTIVITAMIN PO) Take 1 Tab by mouth every evening.     • vitamin D (CHOLECALCIFEROL) 1000 UNIT TABS Take 2,000 Units by mouth every evening.       No current facility-administered medications for this visit.       Social History   Substance Use Topics   • Smoking status: Former Smoker -- 2.00 packs/day for 50 years     Types: Cigarettes     Quit date: 01/01/2004   • Smokeless tobacco: Former User     Types: Chew     Quit date: 03/14/2002   • Alcohol Use: No       Past Medical History   Diagnosis Date   • EMPHYSEMA 5/8/2012   • MEDICAL HOME 10/24/12   • BPH (benign prostatic hyperplasia) 6/3/2013   • Hiatus hernia syndrome    • Unspecified cataract      bilateral IOL   • Hepatitis A      pt thinks  it was A from  \"food \"   • Dental disorder      " "upper/lower   • Breath shortness      1-3 l/m 24/7   • Anemia    • RA    • Rheumatoid arthritis, adult (CMS-HCC)    • Anesthesia      pt said \"can't be on vent due to coughing after\"   • Hemorrhagic disorder (CMS-HCC) 10/12/15     on blood thinner   • Arrhythmia      hx a fib       Past Surgical History   Procedure Laterality Date   • Hip hemiarthroplasty  12/28/2014     Performed by Vincent Krishna M.D. at SURGERY [facility] ORS   • Other abdominal surgery  2004      colon resection colostomy with colostomy take down   • Recovery  2/13/2015     Performed by -Recovery Surgery at SURGERY SAME DAY [facility] ORS   • Bronchoscopy-endo N/A 10/13/2015     Procedure: BRONCHOSCOPY-ENDO;  Surgeon: James WHITLEY M.D.;  Location: SURGERY [facility];  Service:    • Bronchoscopy-endo N/A 3/3/2016     Procedure: FIBEROPTIC BRONCHOSCOPY-ENDO W/BRONCHOALVEOLAR LAVAGE;  Surgeon: Ric Dailey M.D.;  Location: SURGERY [facility];  Service:        Allergies:  Review of patient's allergies indicates no known allergies.    Family History   Problem Relation Age of Onset   • Hypertension Father    • Stroke         Physical Examination    Filed Vitals:    01/20/17 1346   Height: 1.702 m (5' 7.01\")   Weight: 77.111 kg (170 lb)   Weight % change since last entry.: 0 %   BP: 140/86   Pulse: 89   BMI (Calculated): 26.62   Resp: 16   Temp: 36.6 °C (97.9 °F)   O2 sat % on O2: 95 %   O2 Flow Rate (L/min): 4       Physical Exam:  Constitutional:  Well developed and well nourished.  Head:  Normocephalic and atraumatic.  Nose:  Nose normal.  Mouth/Throat:  Oropharynx is clear and moist, no lesions.    Neck:  Normal range of motion.  Supple.  No JVD.  Cardiovascular:  Normal rate, regular rhythm, normal heart sounds. No edema  Pulmonary/Chest: No wheezing, rales or rhonchi.  Respiratory effort non labored  Musculoskeletal.  No muscular atrophy.  Lymphadenopathy:  No cervical or supraclavicular adenopathy  Neurological:  " Alert and oriented.  Cranial nerves intact.  No focal deficits  Skin:  No rashes or ulcers.  Psyciatric:  Normal mood and affect.      Assessment and Plan:  1. COPD, severe (CMS-HCC)  I have recommended pulmonary rehabilitation. He will continue with the Advair and Spiriva and albuterol inhalers. He will continue with oxygen 24 hours a day. He is up-to-date with his vaccines. The plan will be to repeat pulmonary function tests next visit.  - REFERRAL TO [facility] (HIP) Services Requested:: Pulmonary Rehab; Reason for Visit:: COPD/Emphysema    2. Edema of both legs  The patient does have some edema of the lower extremities. I started him on Maxide. I would like him to use it one tablet daily for the next 3 days and then after that just use as needed. He was advised that he should not use the Maxide more than 2 or 3 times a week.  - triamterene-hctz (MAXZIDE-25/DYAZIDE) 37.5-25 MG Tab; Take 1 Tab by mouth every day.  Dispense: 30 Tab; Refill: 3    3. Lung mass  The patient has a large left lung mass. This appears to be benign. The patient has severe COPD and is not a candidate for surgical resection. The patient continues to prefer radiographic and clinical follow-up. The plan will be to repeat his chest x-ray next visit        Followup Return in about 6 months (around 7/20/2017) for follow up with the pulmonary physician, follow up visit with JESSICA.

## 2017-02-15 NOTE — PROCEDURES
Spirometry demonstrated marked reduction of mid flows at 0.43 liters per   second, 28% predicted, forced vital capacity was 66% predicted.    Bronchodilator response was borderline, clinical trial could be indicated.    Moderate-to-severe hyperinflation is noted.  Marked reduction of oxygen   transfer suggests underlying emphysema, clinical correlation required.  Flow   volume loop confirms the severe obstructive pattern.       ____________________________________     MD CORA FELIX / KAYLA    DD:  02/14/2017 12:34:04  DT:  02/14/2017 16:39:52    D#:  [id number]  Job#:  [id number]

## 2017-03-07 NOTE — TELEPHONE ENCOUNTER
Have we ever prescribed this med? Yes.  If yes, what date? 8/5/16    Last OV: 1/20/17    Next OV: 6 month return- no appt on file    DX: COPD    Medications:  Current Outpatient Prescriptions   Medication Sig Dispense Refill   • apixaban (ELIQUIS) 5mg Tab Take 1 Tab by mouth 2 Times a Day. 90 Tab 1   • methotrexate 2.5 MG Tab Take 2 Tabs by mouth every 7 days. Unknown what day he takes this. 10 Tab 6   • albuterol (PROVENTIL) 2.5mg/3ml Nebu Soln solution for nebulization      • triamterene-hctz (MAXZIDE-25/DYAZIDE) 37.5-25 MG Tab Take 1 Tab by mouth every day. 30 Tab 3   • albuterol (PROVENTIL) 2.5mg/0.5ml Nebu Soln 0.5 mL by Nebulization route 2 Times a Day. 375 mL 5   • dutasteride (AVODART) 0.5 MG capsule Take 1 Cap by mouth every evening. 90 Cap 3   • terazosin (HYTRIN) 5 MG Cap Take 1 Cap by mouth every day. 30 Cap 11   • methylPREDNISolone (MEDROL) 4 MG Tab Take 1 Tab by mouth every day. 90 Tab 3   • tiotropium (SPIRIVA) 18 MCG Cap Inhale 1 Cap by mouth every day. 90 Cap 3   • albuterol 108 (90 BASE) MCG/ACT Aero Soln inhalation aerosol Inhale 2 Puffs by mouth every 6 hours as needed. Indications: Asthma 8.5 g 5   • atorvastatin (LIPITOR) 40 MG Tab Take 1 Tab by mouth every bedtime. (Patient not taking: Reported on 12/20/2016) 30 Tab 6   • folic acid (FOLVITE) 1 MG Tab Take 2 mg by mouth every evening.     • methylPREDNISolone (MEDROL) 4 MG Tab Take 4 mg by mouth every evening. COPD - longer term treatment     • fluticasone-salmeterol (ADVAIR DISKUS) 500-50 MCG/DOSE AEROSOL POWDER, BREATH ACTIVATED Inhale 1 Puff by mouth every 12 hours. 1 Inhaler 5   • amlodipine (NORVASC) 5 MG Tab Take 1 Tab by mouth every bedtime. 30 Tab 11   • Multiple Vitamins-Minerals (MULTIVITAMIN PO) Take 1 Tab by mouth every evening.     • vitamin D (CHOLECALCIFEROL) 1000 UNIT TABS Take 2,000 Units by mouth every evening.       No current facility-administered medications for this visit.

## 2017-04-14 NOTE — TELEPHONE ENCOUNTER
Pt is requesting an antibiotic and a Medrol Dose-libia be sent to pharmacy on file, states that he has been very sob of 3-4 days, no cough, no pain, no noise from lungs, no fever, no chills, on 3L O2 at night and as prn, taking inhalers and using neb machine as directed.  Please advise!    Last OV: 1/20/17  Next OV: 6 month return- no appt on file  COPD  Antibiotic and Medrol Dosepak

## 2017-04-14 NOTE — TELEPHONE ENCOUNTER
Prednisone is ok to start taking now. I will send a backup antibiotic however without infectious symptoms such as cough, fever and chills I do not think he requires antibiotic at this time. Should he have any worsening symptoms over the weekend he needs to be seen in the ER.

## 2017-04-21 NOTE — PROGRESS NOTES
"Subjective:   Meng Snyder is a 81 y.o. male who presents today concerned about increasing leg edema.  For several years he has noted increased leg edema, right more so than left.  His chronic asymmetric edema has become more prominent recently particularly with recent Medrol Dosepak. There is no history of significant injury or phlebitis to either leg. He's noticed a little more leg edema since his stroke in August   He does take amlodipine.  He takes Dyazide to 3 times a week.  COPD continues to be the major issue    Home blood pressures are checked daily. Typically blood pressure is 120 systolic    Past Medical History   Diagnosis Date   • EMPHYSEMA 5/8/2012   • MEDICAL HOME 10/24/12   • BPH (benign prostatic hyperplasia) 6/3/2013   • Hiatus hernia syndrome    • Unspecified cataract      bilateral IOL   • Hepatitis A      pt thinks  it was A from  \"food \"   • Dental disorder      upper/lower   • Breath shortness      1-3 l/m 24/7   • Anemia    • RA    • Rheumatoid arthritis, adult (CMS-HCC)    • Anesthesia      pt said \"can't be on vent due to coughing after\"   • Hemorrhagic disorder (CMS-HCC) 10/12/15     on blood thinner   • Arrhythmia      hx a fib     Past Surgical History   Procedure Laterality Date   • Hip hemiarthroplasty  12/28/2014     Performed by Vincent Krishna M.D. at SURGERY [facility] ORS   • Other abdominal surgery  2004      colon resection colostomy with colostomy take down   • Recovery  2/13/2015     Performed by [facility] at SURGERY SAME DAY [facility] ORS   • Bronchoscopy-endo N/A 10/13/2015     Procedure: BRONCHOSCOPY-ENDO;  Surgeon: James WHITLEY M.D.;  Location: SURGERY [facility];  Service:    • Bronchoscopy-endo N/A 3/3/2016     Procedure: FIBEROPTIC BRONCHOSCOPY-ENDO W/BRONCHOALVEOLAR LAVAGE;  Surgeon: Ric Dailey M.D.;  Location: SURGERY [facility];  Service:      Family History   Problem Relation Age of Onset   • Hypertension Father    • " Stroke       History   Smoking status   • Former Smoker -- 2.00 packs/day for 50 years   • Types: Cigarettes   • Quit date: 01/01/2004   Smokeless tobacco   • Former User   • Types: Chew   • Quit date: 03/14/2002     No Known Allergies  Outpatient Encounter Prescriptions as of 4/21/2017   Medication Sig Dispense Refill   • triamterene-hctz (MAXZIDE-25/DYAZIDE) 37.5-25 MG Tab Take 1 Tab by mouth every day. 30 Tab 11   • apixaban (ELIQUIS) 5mg Tab Take 1 Tab by mouth 2 Times a Day. 180 Tab 1   • predniSONE (DELTASONE) 5 MG Tab Take 1 Tab by mouth every day. 90 Tab 3   • fluticasone-salmeterol (ADVAIR DISKUS) 500-50 MCG/DOSE AEROSOL POWDER, BREATH ACTIVATED Inhale 1 Puff by mouth every 12 hours. 3 Inhaler 3   • methotrexate 2.5 MG Tab Take 2 Tabs by mouth every 7 days. Unknown what day he takes this. 10 Tab 6   • albuterol (PROVENTIL) 2.5mg/0.5ml Nebu Soln 0.5 mL by Nebulization route 2 Times a Day. 375 mL 5   • dutasteride (AVODART) 0.5 MG capsule Take 1 Cap by mouth every evening. 90 Cap 3   • terazosin (HYTRIN) 5 MG Cap Take 1 Cap by mouth every day. 30 Cap 11   • tiotropium (SPIRIVA) 18 MCG Cap Inhale 1 Cap by mouth every day. 90 Cap 3   • albuterol 108 (90 BASE) MCG/ACT Aero Soln inhalation aerosol Inhale 2 Puffs by mouth every 6 hours as needed. Indications: Asthma 8.5 g 5   • folic acid (FOLVITE) 1 MG Tab Take 2 mg by mouth every evening.     • Multiple Vitamins-Minerals (MULTIVITAMIN PO) Take 1 Tab by mouth every evening.     • vitamin D (CHOLECALCIFEROL) 1000 UNIT TABS Take 2,000 Units by mouth every evening.     • azithromycin (ZITHROMAX) 250 MG Tab Take as directed (Patient not taking: Reported on 4/21/2017) 6 Tab 0   • predniSONE (DELTASONE) 10 MG Tab Take 30mg x 3 days, then take 20mg x 3 days, then take 10mg x 3 days, with food, then discontinue. (Patient not taking: Reported on 4/21/2017) 18 Tab 0   • albuterol (PROVENTIL) 2.5mg/3ml Nebu Soln solution for nebulization      • [DISCONTINUED]  "triamterene-hctz (MAXZIDE-25/DYAZIDE) 37.5-25 MG Tab Take 1 Tab by mouth every day. 30 Tab 3   • methylPREDNISolone (MEDROL) 4 MG Tab Take 1 Tab by mouth every day. (Patient not taking: Reported on 4/21/2017) 90 Tab 3   • atorvastatin (LIPITOR) 40 MG Tab Take 1 Tab by mouth every bedtime. (Patient not taking: Reported on 12/20/2016) 30 Tab 6   • methylPREDNISolone (MEDROL) 4 MG Tab Take 4 mg by mouth every evening. COPD - longer term treatment     • [DISCONTINUED] amlodipine (NORVASC) 5 MG Tab Take 1 Tab by mouth every bedtime. 30 Tab 11     No facility-administered encounter medications on file as of 4/21/2017.     Review of Systems   Constitutional: Negative for fever, chills and malaise/fatigue.   HENT: Negative for nosebleeds.    Eyes: Negative for blurred vision and discharge.   Respiratory: Positive for shortness of breath. Negative for cough.    Cardiovascular: Positive for leg swelling. Negative for chest pain, palpitations and PND.   Gastrointestinal: Negative for heartburn and nausea.   Genitourinary: Positive for frequency. Negative for dysuria and urgency.   Musculoskeletal: Positive for joint pain. Negative for myalgias.   Skin: Negative for itching and rash.   Neurological: Negative for dizziness and headaches.   Endo/Heme/Allergies: Negative for environmental allergies. Does not bruise/bleed easily.   Psychiatric/Behavioral: Negative for depression. The patient is not nervous/anxious.         Objective:   /88 mmHg  Pulse 88  Ht 1.727 m (5' 8\")  Wt 74.39 kg (164 lb)  BMI 24.94 kg/m2  SpO2 92%    Physical Exam   Constitutional: He is oriented to person, place, and time. He appears well-developed and well-nourished.   Pleasant man wearing oxygen   HENT:   Head: Normocephalic and atraumatic.   Eyes: Conjunctivae and EOM are normal. No scleral icterus.   Neck: Neck supple. No JVD present. No thyromegaly present.   Cardiovascular: Normal rate and normal heart sounds.  An irregularly irregular " rhythm present. Exam reveals no gallop and no friction rub.    No murmur heard.  Heart rate at rest 75   Pulmonary/Chest: No respiratory distress. He has no wheezes. He has no rales. He exhibits no tenderness.   Distant breath sounds and prolonged expiratory phase bilaterally.  No rales or wheezing.  Labored breathing   Abdominal: Soft. Bowel sounds are normal. He exhibits no distension and no mass. There is no tenderness.   Musculoskeletal: He exhibits edema.   Mild lower ankle edema on the left side.  Far more significant pitting edema of the lower 3rd of the right leg extending somewhat superiorly near the knee   Neurological: He is alert and oriented to person, place, and time. Coordination normal.   Skin: Skin is warm and dry. No rash noted. No pallor.   Psychiatric: He has a normal mood and affect. His behavior is normal. Judgment and thought content normal.       Assessment:     1. Atrial fibrillation, unspecified type (CMS-HCC)     2. Chronic anticoagulation     3. HTN (hypertension), benign     4. Pulmonary emphysema, unspecified emphysema type (CMS-HCC)     5. Localized edema     6. Edema of both legs  triamterene-hctz (MAXZIDE-25/DYAZIDE) 37.5-25 MG Tab       Medical Decision Making:  Today's Assessment / Status / Plan:   Discontinue amlodipine  Resume  Dyazide daily  If still bothered by significant edema, he will call us and we will consider Lasix 20 mg per day.  Clinically, this is not congestive heart failure  Return in December

## 2017-04-21 NOTE — Clinical Note
"     [facility]   [address]   [address]  [place] [address]  Phone: [phone]  Fax: [phone]              Meng Snyder  1935    Encounter Date: 4/21/2017    Vincent Dumont M.D.          PROGRESS NOTE:  Subjective:   Meng Snyder is a 81 y.o. male who presents today concerned about increasing leg edema.  For several years he has noted increased leg edema, right more so than left.  His chronic asymmetric edema has become more prominent recently particularly with recent Medrol Dosepak. There is no history of significant injury or phlebitis to either leg. He's noticed a little more leg edema since his stroke in August   He does take amlodipine.  He takes Dyazide to 3 times a week.  COPD continues to be the major issue    Home blood pressures are checked daily. Typically blood pressure is 120 systolic    Past Medical History   Diagnosis Date   • EMPHYSEMA 5/8/2012   • MEDICAL HOME 10/24/12   • BPH (benign prostatic hyperplasia) 6/3/2013   • Hiatus hernia syndrome    • Unspecified cataract      bilateral IOL   • Hepatitis A      pt thinks  it was A from  \"food \"   • Dental disorder      upper/lower   • Breath shortness      1-3 l/m 24/7   • Anemia    • RA    • Rheumatoid arthritis, adult (CMS-HCC)    • Anesthesia      pt said \"can't be on vent due to coughing after\"   • Hemorrhagic disorder (CMS-HCC) 10/12/15     on blood thinner   • Arrhythmia      hx a fib     Past Surgical History   Procedure Laterality Date   • Hip hemiarthroplasty  12/28/2014     Performed by Vincent Krishna M.D. at SURGERY [facility] ORS   • Other abdominal surgery  2004      colon resection colostomy with colostomy take down   • Recovery  2/13/2015     Performed by -Recovery Surgery at SURGERY SAME DAY [facility] ORS   • Bronchoscopy-endo N/A 10/13/2015     Procedure: BRONCHOSCOPY-ENDO;  Surgeon: James WHITLEY M.D.;  Location: SURGERY " [facility];  Service:    • Bronchoscopy-endo N/A 3/3/2016     Procedure: FIBEROPTIC BRONCHOSCOPY-ENDO W/BRONCHOALVEOLAR LAVAGE;  Surgeon: Ric Dailey M.D.;  Location: SURGERY [facility];  Service:      Family History   Problem Relation Age of Onset   • Hypertension Father    • Stroke       History   Smoking status   • Former Smoker -- 2.00 packs/day for 50 years   • Types: Cigarettes   • Quit date: 01/01/2004   Smokeless tobacco   • Former User   • Types: Chew   • Quit date: 03/14/2002     No Known Allergies  Outpatient Encounter Prescriptions as of 4/21/2017   Medication Sig Dispense Refill   • triamterene-hctz (MAXZIDE-25/DYAZIDE) 37.5-25 MG Tab Take 1 Tab by mouth every day. 30 Tab 11   • apixaban (ELIQUIS) 5mg Tab Take 1 Tab by mouth 2 Times a Day. 180 Tab 1   • predniSONE (DELTASONE) 5 MG Tab Take 1 Tab by mouth every day. 90 Tab 3   • fluticasone-salmeterol (ADVAIR DISKUS) 500-50 MCG/DOSE AEROSOL POWDER, BREATH ACTIVATED Inhale 1 Puff by mouth every 12 hours. 3 Inhaler 3   • methotrexate 2.5 MG Tab Take 2 Tabs by mouth every 7 days. Unknown what day he takes this. 10 Tab 6   • albuterol (PROVENTIL) 2.5mg/0.5ml Nebu Soln 0.5 mL by Nebulization route 2 Times a Day. 375 mL 5   • dutasteride (AVODART) 0.5 MG capsule Take 1 Cap by mouth every evening. 90 Cap 3   • terazosin (HYTRIN) 5 MG Cap Take 1 Cap by mouth every day. 30 Cap 11   • tiotropium (SPIRIVA) 18 MCG Cap Inhale 1 Cap by mouth every day. 90 Cap 3   • albuterol 108 (90 BASE) MCG/ACT Aero Soln inhalation aerosol Inhale 2 Puffs by mouth every 6 hours as needed. Indications: Asthma 8.5 g 5   • folic acid (FOLVITE) 1 MG Tab Take 2 mg by mouth every evening.     • Multiple Vitamins-Minerals (MULTIVITAMIN PO) Take 1 Tab by mouth every evening.     • vitamin D (CHOLECALCIFEROL) 1000 UNIT TABS Take 2,000 Units by mouth every evening.     • azithromycin (ZITHROMAX) 250 MG Tab Take as directed (Patient not taking: Reported on 4/21/2017) 6 Tab 0    "  • predniSONE (DELTASONE) 10 MG Tab Take 30mg x 3 days, then take 20mg x 3 days, then take 10mg x 3 days, with food, then discontinue. (Patient not taking: Reported on 4/21/2017) 18 Tab 0   • albuterol (PROVENTIL) 2.5mg/3ml Nebu Soln solution for nebulization      • [DISCONTINUED] triamterene-hctz (MAXZIDE-25/DYAZIDE) 37.5-25 MG Tab Take 1 Tab by mouth every day. 30 Tab 3   • methylPREDNISolone (MEDROL) 4 MG Tab Take 1 Tab by mouth every day. (Patient not taking: Reported on 4/21/2017) 90 Tab 3   • atorvastatin (LIPITOR) 40 MG Tab Take 1 Tab by mouth every bedtime. (Patient not taking: Reported on 12/20/2016) 30 Tab 6   • methylPREDNISolone (MEDROL) 4 MG Tab Take 4 mg by mouth every evening. COPD - longer term treatment     • [DISCONTINUED] amlodipine (NORVASC) 5 MG Tab Take 1 Tab by mouth every bedtime. 30 Tab 11     No facility-administered encounter medications on file as of 4/21/2017.     Review of Systems   Constitutional: Negative for fever, chills and malaise/fatigue.   HENT: Negative for nosebleeds.    Eyes: Negative for blurred vision and discharge.   Respiratory: Positive for shortness of breath. Negative for cough.    Cardiovascular: Positive for leg swelling. Negative for chest pain, palpitations and PND.   Gastrointestinal: Negative for heartburn and nausea.   Genitourinary: Positive for frequency. Negative for dysuria and urgency.   Musculoskeletal: Positive for joint pain. Negative for myalgias.   Skin: Negative for itching and rash.   Neurological: Negative for dizziness and headaches.   Endo/Heme/Allergies: Negative for environmental allergies. Does not bruise/bleed easily.   Psychiatric/Behavioral: Negative for depression. The patient is not nervous/anxious.         Objective:   /88 mmHg  Pulse 88  Ht 1.727 m (5' 8\")  Wt 74.39 kg (164 lb)  BMI 24.94 kg/m2  SpO2 92%    Physical Exam   Constitutional: He is oriented to person, place, and time. He appears well-developed and well-nourished. "   Pleasant man wearing oxygen   HENT:   Head: Normocephalic and atraumatic.   Eyes: Conjunctivae and EOM are normal. No scleral icterus.   Neck: Neck supple. No JVD present. No thyromegaly present.   Cardiovascular: Normal rate and normal heart sounds.  An irregularly irregular rhythm present. Exam reveals no gallop and no friction rub.    No murmur heard.  Heart rate at rest 75   Pulmonary/Chest: No respiratory distress. He has no wheezes. He has no rales. He exhibits no tenderness.   Distant breath sounds and prolonged expiratory phase bilaterally.  No rales or wheezing.  Labored breathing   Abdominal: Soft. Bowel sounds are normal. He exhibits no distension and no mass. There is no tenderness.   Musculoskeletal: He exhibits edema.   Mild lower ankle edema on the left side.  Far more significant pitting edema of the lower 3rd of the right leg extending somewhat superiorly near the knee   Neurological: He is alert and oriented to person, place, and time. Coordination normal.   Skin: Skin is warm and dry. No rash noted. No pallor.   Psychiatric: He has a normal mood and affect. His behavior is normal. Judgment and thought content normal.       Assessment:     1. Atrial fibrillation, unspecified type (CMS-HCC)     2. Chronic anticoagulation     3. HTN (hypertension), benign     4. Pulmonary emphysema, unspecified emphysema type (CMS-HCC)     5. Localized edema     6. Edema of both legs  triamterene-hctz (MAXZIDE-25/DYAZIDE) 37.5-25 MG Tab       Medical Decision Making:  Today's Assessment / Status / Plan:   Discontinue amlodipine  Resume  Dyazide daily  If still bothered by significant edema, he will call us and we will consider Lasix 20 mg per day.  Clinically, this is not congestive heart failure  Return in December        Ric Gil M.D.  [address]  [facility] [address]  [place] [address]  VIA In Basket

## 2017-04-21 NOTE — MR AVS SNAPSHOT
"        Meng Snyder   2017 11:30 AM   Office Visit   MRN: [id number]    Department:  [facility]   Dept Phone:  [phone]    Description:  Male : 1935   Provider:  Vincent Dumont M.D.           Reason for Visit     Follow-Up           Allergies as of 2017     No Known Allergies      You were diagnosed with     Atrial fibrillation, unspecified type (CMS-[facility])   [2682410]       Chronic anticoagulation   [872298]       HTN (hypertension), benign   [179273]       Pulmonary emphysema, unspecified emphysema type (CMS-HCC)   [4873660]       Localized edema   [796664]       Edema of both legs   [242054]         Vital Signs     Blood Pressure Pulse Height Weight Body Mass Index Oxygen Saturation    142/88 mmHg 88 1.727 m (5' 8\") 74.39 kg (164 lb) 24.94 kg/m2 92%    Smoking Status                   Former Smoker           Basic Information     Date Of Birth Sex Race Ethnicity Preferred Language    1935 Male White Non- English      Your appointments     2017 10:00 AM   Pulmonary Rehab Class with PULMONARY REHAB PM CLASS   PULMONARY LAB OP [facility] (--)    [address] [facility] [address]   [phone]            2017 10:00 AM   Pulmonary Rehab Class with PULMONARY REHAB PM CLASS   PULMONARY LAB OP [facility] (--)    [address] [facility] [address]   [phone]            May 02, 2017 10:00 AM   Pulmonary Rehab Class with PULMONARY REHAB PM CLASS   PULMONARY LAB [facility] (--)    [address] [facility] [address]   [phone]            May 04, 2017 10:00 AM   Pulmonary Rehab Class with PULMONARY REHAB PM CLASS   PULMONARY LAB [facility] (--)    [address] [facility] [address]   [phone]            May 09, 2017 10:00 AM   Pulmonary Rehab Class with PULMONARY REHAB PM CLASS   PULMONARY LAB [facility] (--)    [address] [facility] [address]   [phone]            May 11, 2017 10:00 AM   Pulmonary Rehab Class with PULMONARY REHAB PM CLASS   PULMONARY " LAB OP [facility] (--)    [address] [facility]  [place] [address]   [phone]            May 16, 2017 10:00 AM   Pulmonary Rehab Class with PULMONARY REHAB PM CLASS   PULMONARY LAB [facility] (--)    [address] [facility]  [place] [address]   [phone]            May 18, 2017 10:00 AM   Pulmonary Rehab Class with PULMONARY REHAB PM CLASS   PULMONARY LAB [facility] (--)    [address] [facility] [address]   [phone]            May 23, 2017 10:00 AM   Pulmonary Rehab Class with PULMONARY REHAB PM CLASS   PULMONARY LAB [facility] (--)    [address] [facility]  [place] [address]   [phone]            May 25, 2017 10:00 AM   Pulmonary Rehab Class with PULMONARY REHAB PM CLASS   PULMONARY LAB [facility] (--)    [address] [facility] [address]   [phone]            May 30, 2017 10:00 AM   Pulmonary Rehab Class with PULMONARY REHAB PM CLASS   PULMONARY LAB [facility] (--)    [address] [facility] [address]   [phone]            Jun 01, 2017 10:00 AM   Pulmonary Rehab Class with PULMONARY REHAB PM CLASS   PULMONARY LAB [facility] (--)    [address] [facility] [address]   [phone]            Jun 06, 2017 10:00 AM   Pulmonary Rehab Class with PULMONARY REHAB PM CLASS   PULMONARY LAB [facility] (--)    [address] [facility] [address]   [phone]            Jun 08, 2017 10:00 AM   Pulmonary Rehab Class with PULMONARY REHAB PM CLASS   PULMONARY LAB [facility] (--)    [address] [facility] [address]   [phone]            Jun 13, 2017 10:00 AM   Pulmonary Rehab Class with PULMONARY REHAB PM CLASS   PULMONARY LAB [facility] (--)    [address] [facility] [address]   [phone]            Efra 15, 2017 10:00 AM   Pulmonary Rehab Class with PULMONARY REHAB PM CLASS   PULMONARY LAB [facility] (--)    [address] [facility] [address]   [phone]            Jun 20, 2017 10:30 AM   FOLLOW UP with Vincent Dumont M.D.   [facility] (--)    [address]  [address]  [address]  [facility] [address] [phone]            Jun 20, 2017  1:00 PM   Pulmonary Rehab Class with PULMONARY REHAB PM CLASS   PULMONARY LAB OP [facility] (--)    [address] [facility]  [place] [id number] [phone]            Dec 11, 2017 11:00 AM   FOLLOW UP with Vincent Dumont M.D.   [facility] (--)    [address]  [address]  [place] [address] [phone]              Problem List              ICD-10-CM Priority Class Noted - Resolved    HTN (hypertension), benign (Chronic) I10 Low  5/8/2012 - Present    Family history of colon cancer Z80.0   5/8/2012 - Present    Rheumatoid arthritis (CMS-HCC) (Chronic) M06.9 Low  5/8/2012 - Present    BPH (benign prostatic hyperplasia) (Chronic) N40.0 Low  6/3/2013 - Present    COPD (chronic obstructive pulmonary disease) (CMS-HCC) J44.9 Low  12/28/2014 - Present    Other chronic pulmonary heart diseases (CMS-HCC) I27.89 High  5/7/2015 - Present    Chronic respiratory failure (CMS-HCC) J96.10 Low  5/7/2015 - Present    Tremor R25.1   6/26/2015 - Present    Headache R51   8/22/2016 - Present    Mass of lower lobe of left lung R91.8   8/22/2016 - Present    CVA (cerebral vascular accident) (CMS-HCC) I63.9 High  8/23/2016 - Present    Atrial fibrillation (CMS-HCC) I48.91 Medium  8/23/2016 - Present    CKD (chronic kidney disease), 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 Low  8/23/2016 - Present    Dyslipidemia E78.5 Low  8/23/2016 - Present    Late effect of stroke I69.30   9/8/2016 - Present      Health Maintenance        Date Due Completion Dates    IMM ZOSTER VACCINE 9/17/1997 ---    COLONOSCOPY 5/8/2016 5/8/2011 (Done)    Override on 5/8/2011: Done    IMM DTaP/Tdap/Td Vaccine (2 - Td) 5/8/2022 5/8/2012            Current Immunizations     13-VALENT PCV PREVNAR 9/19/2016 11:18 AM    Hepatitis B Vaccine Non-Recombivax (Ped/Adol) 11/19/2012    Influenza TIV (IM) 10/1/2016, 10/1/2015, 9/26/2014    Pneumococcal polysaccharide vaccine  (PPSV-23) 12/1/2014    Tdap Vaccine 5/8/2012      Below and/or attached are the medications your provider expects you to take. Review all of your home medications and newly ordered medications with your provider and/or pharmacist. Follow medication instructions as directed by your provider and/or pharmacist. Please keep your medication list with you and share with your provider. Update the information when medications are discontinued, doses are changed, or new medications (including over-the-counter products) are added; and carry medication information at all times in the event of emergency situations     Allergies:  No Known Allergies          Medications  Valid as of: April 21, 2017 - 12:01 PM    Generic Name Brand Name Tablet Size Instructions for use    Albuterol Sulfate (Aero Soln) albuterol 108 (90 BASE) MCG/ACT Inhale 2 Puffs by mouth every 6 hours as needed. Indications: Asthma        Albuterol Sulfate (Nebu Soln) PROVENTIL 2.5mg/0.5ml 0.5 mL by Nebulization route 2 Times a Day.        Albuterol Sulfate (Nebu Soln) PROVENTIL 2.5mg/3ml         Apixaban (Tab) ELIQUIS 5mg Take 1 Tab by mouth 2 Times a Day.        Atorvastatin Calcium (Tab) LIPITOR 40 MG Take 1 Tab by mouth every bedtime.        Azithromycin (Tab) ZITHROMAX 250 MG Take as directed        Cholecalciferol (Tab) cholecalciferol 1000 UNIT Take 2,000 Units by mouth every evening.        Dutasteride (Cap) AVODART 0.5 MG Take 1 Cap by mouth every evening.        Fluticasone-Salmeterol (AEROSOL POWDER, BREATH ACTIVATED) ADVAIR 500-50 MCG/DOSE Inhale 1 Puff by mouth every 12 hours.        Folic Acid (Tab) FOLVITE 1 MG Take 2 mg by mouth every evening.        Methotrexate Sodium (Tab) methotrexate 2.5 MG Take 2 Tabs by mouth every 7 days. Unknown what day he takes this.        MethylPREDNISolone (Tab) MEDROL 4 MG Take 4 mg by mouth every evening. COPD - longer term treatment        MethylPREDNISolone (Tab) MEDROL 4 MG Take 1 Tab by mouth every day.           Multiple Vitamins-Minerals   Take 1 Tab by mouth every evening.        PredniSONE (Tab) DELTASONE 5 MG Take 1 Tab by mouth every day.        PredniSONE (Tab) DELTASONE 10 MG Take 30mg x 3 days, then take 20mg x 3 days, then take 10mg x 3 days, with food, then discontinue.        Terazosin HCl (Cap) HYTRIN 5 MG Take 1 Cap by mouth every day.        Tiotropium Bromide Monohydrate (Cap) SPIRIVA 18 MCG Inhale 1 Cap by mouth every day.        Triamterene-HCTZ (Tab) MAXZIDE-25/DYAZIDE 37.5-25 MG Take 1 Tab by mouth every day.        .                 Medicines prescribed today were sent to:     [id number] - [place], [place] - [address]    [address] [facility] [place] [address]    Phone: [phone] Fax: [phone]    Open 24 Hours?: No    [facility]    [address] [facility] [address] [place] [address]    Phone: [phone] Fax: [phone]      Medication refill instructions:       If your prescription bottle indicates you have medication refills left, it is not necessary to call your provider’s office. Please contact your pharmacy and they will refill your medication.    If your prescription bottle indicates you do not have any refills left, you may request refills at any time through one of the following ways: The online [employer] system (except Urgent Care), by calling your provider’s office, or by asking your pharmacy to contact your provider’s office with a refill request. Medication refills are processed only during regular business hours and may not be available until the next business day. Your provider may request additional information or to have a follow-up visit with you prior to refilling your medication.   *Please Note: Medication refills are assigned a new Rx number when refilled electronically. Your pharmacy may indicate that no refills were authorized even though a new prescription for the same medication is available at the pharmacy. Please request the medicine by name with the pharmacy  before contacting your provider for a refill.        Other Notes About Your Plan     Patient Enrolled in Medical Home with Dr. Gil.   Please assess and status chronic disease from prior visits.    No queries.           Vital Therapies Access Code: Activation code not generated  Current Vital Therapies Status: Active

## 2017-05-19 NOTE — PROGRESS NOTES
"Subjective:      Meng Snyder is a 81 y.o. male who presents with Dysuria    Patient is accompanied by his wife.         Dysuria   This is a new problem. The current episode started yesterday. The problem occurs every urination. The problem has been gradually worsening. The quality of the pain is described as burning. The pain is at a severity of 4/10. The pain is moderate. There has been no fever. There is no history of pyelonephritis. Associated symptoms include frequency and urgency. Pertinent negatives include no chills, discharge, flank pain, hematuria, nausea or vomiting. He has tried nothing for the symptoms. There is no history of catheterization, recurrent UTIs or a urological procedure.     Patient presents to urgent care reporting dysuria x 2 days. States he has had UTIs in the past, and this feels similar. Denies any abdominal pain, flank pain, or fevers/chills.     Review of Systems   Constitutional: Negative for fever and chills.   Respiratory: Positive for shortness of breath. Negative for sputum production and wheezing.    Cardiovascular: Negative for chest pain.   Gastrointestinal: Negative for nausea, vomiting, abdominal pain and diarrhea.   Genitourinary: Positive for dysuria, urgency and frequency. Negative for hematuria and flank pain.   Musculoskeletal: Negative.    Neurological: Negative for dizziness and headaches.          Objective:     /90 mmHg  Pulse 102  Temp(Src) 36.3 °C (97.3 °F)  Resp 18  Ht 1.702 m (5' 7\")  Wt 73.483 kg (162 lb)  BMI 25.37 kg/m2  SpO2 96%     Physical Exam   Constitutional: He is oriented to person, place, and time. He appears well-developed and well-nourished. No distress.   HENT:   Head: Normocephalic and atraumatic.   Eyes: Pupils are equal, round, and reactive to light.   Neck: Normal range of motion.   Cardiovascular: Normal rate, regular rhythm and normal heart sounds.  Exam reveals no friction rub.    No murmur heard.  Pulmonary/Chest: " Effort normal. No respiratory distress. He has no wheezes. He has no rales.   Patient on intermittent 4 L NC. Decreased breath sounds throughout all lung fields.    Abdominal: Soft. Bowel sounds are normal. He exhibits no distension. There is no tenderness.   No CVAT bilaterally   Musculoskeletal: Normal range of motion.   Neurological: He is alert and oriented to person, place, and time.   Skin: Skin is warm and dry. He is not diaphoretic.   Psychiatric: He has a normal mood and affect. His behavior is normal.   Nursing note and vitals reviewed.         PMH:  has a past medical history of EMPHYSEMA (5/8/2012); MEDICAL HOME (10/24/12); BPH (benign prostatic hyperplasia) (6/3/2013); Hiatus hernia syndrome; Unspecified cataract; Hepatitis A; Dental disorder; Breath shortness; Anemia; RA; Rheumatoid arthritis, adult (CMS-HCC); Anesthesia; Hemorrhagic disorder (CMS-[facility]) (10/12/15); and Arrhythmia.  MEDS:   Current outpatient prescriptions:   •  cephALEXin (KEFLEX) 500 MG Cap, Take 1 Cap by mouth 3 times a day for 7 days., Disp: 21 Cap, Rfl: 0  •  phenazopyridine (PYRIDIUM) 100 MG Tab, Take 1 Tab by mouth 3 times a day as needed for up to 3 days., Disp: 9 Tab, Rfl: 0  •  folic acid (FOLVITE) 1 MG Tab, Take 2 Tabs by mouth every evening., Disp: 180 Tab, Rfl: 3  •  triamterene-hctz (MAXZIDE-25/DYAZIDE) 37.5-25 MG Tab, Take 1 Tab by mouth every day., Disp: 30 Tab, Rfl: 11  •  azithromycin (ZITHROMAX) 250 MG Tab, Take as directed (Patient not taking: Reported on 4/21/2017), Disp: 6 Tab, Rfl: 0  •  predniSONE (DELTASONE) 10 MG Tab, Take 30mg x 3 days, then take 20mg x 3 days, then take 10mg x 3 days, with food, then discontinue. (Patient not taking: Reported on 4/21/2017), Disp: 18 Tab, Rfl: 0  •  apixaban (ELIQUIS) 5mg Tab, Take 1 Tab by mouth 2 Times a Day., Disp: 180 Tab, Rfl: 1  •  predniSONE (DELTASONE) 5 MG Tab, Take 1 Tab by mouth every day., Disp: 90 Tab, Rfl: 3  •  fluticasone-salmeterol (ADVAIR DISKUS) 500-50  MCG/DOSE AEROSOL POWDER, BREATH ACTIVATED, Inhale 1 Puff by mouth every 12 hours., Disp: 3 Inhaler, Rfl: 3  •  methotrexate 2.5 MG Tab, Take 2 Tabs by mouth every 7 days. Unknown what day he takes this., Disp: 10 Tab, Rfl: 6  •  albuterol (PROVENTIL) 2.5mg/3ml Nebu Soln solution for nebulization, , Disp: , Rfl:   •  albuterol (PROVENTIL) 2.5mg/0.5ml Nebu Soln, 0.5 mL by Nebulization route 2 Times a Day., Disp: 375 mL, Rfl: 5  •  dutasteride (AVODART) 0.5 MG capsule, Take 1 Cap by mouth every evening., Disp: 90 Cap, Rfl: 3  •  terazosin (HYTRIN) 5 MG Cap, Take 1 Cap by mouth every day., Disp: 30 Cap, Rfl: 11  •  methylPREDNISolone (MEDROL) 4 MG Tab, Take 1 Tab by mouth every day. (Patient not taking: Reported on 4/21/2017), Disp: 90 Tab, Rfl: 3  •  tiotropium (SPIRIVA) 18 MCG Cap, Inhale 1 Cap by mouth every day., Disp: 90 Cap, Rfl: 3  •  albuterol 108 (90 BASE) MCG/ACT Aero Soln inhalation aerosol, Inhale 2 Puffs by mouth every 6 hours as needed. Indications: Asthma, Disp: 8.5 g, Rfl: 5  •  atorvastatin (LIPITOR) 40 MG Tab, Take 1 Tab by mouth every bedtime. (Patient not taking: Reported on 12/20/2016), Disp: 30 Tab, Rfl: 6  •  methylPREDNISolone (MEDROL) 4 MG Tab, Take 4 mg by mouth every evening. COPD - longer term treatment, Disp: , Rfl:   •  Multiple Vitamins-Minerals (MULTIVITAMIN PO), Take 1 Tab by mouth every evening., Disp: , Rfl:   •  vitamin D (CHOLECALCIFEROL) 1000 UNIT TABS, Take 2,000 Units by mouth every evening., Disp: , Rfl:   ALLERGIES: No Known Allergies  SURGHX:   Past Surgical History   Procedure Laterality Date   • Hip hemiarthroplasty  12/28/2014     Performed by Vincent Krishna M.D. at [facility] ORS   • Other abdominal surgery  2004      colon resection colostomy with colostomy take down   • Recovery  2/13/2015     Performed by [facility] at SURGERY SAME DAY [facility] ORS   • Bronchoscopy-endo N/A 10/13/2015     Procedure: BRONCHOSCOPY-ENDO;  Surgeon: James Langford V  M.D.;  Location: SURGERY [facility];  Service:    • Bronchoscopy-endo N/A 3/3/2016     Procedure: FIBEROPTIC BRONCHOSCOPY-ENDO W/BRONCHOALVEOLAR LAVAGE;  Surgeon: Ric Dailey M.D.;  Location: SURGERY [facility];  Service:      SOCHX:  reports that he quit smoking about 13 years ago. His smoking use included Cigarettes. He has a 100 pack-year smoking history. He quit smokeless tobacco use about 15 years ago. His smokeless tobacco use included Chew. He reports that he does not drink alcohol or use illicit drugs.  FH: family history includes Hypertension in his father; Stroke in an other family member.     POCT Urinalysis:   Component Results      Component Value Ref Range & Units Status     POC Color ORANGE Negative Final     POC Appearance CLOUDY Negative Final     POC Leukocyte Esterase LG Negative Final     POC Nitrites NEG Negative Final     POC Urobiligen 2 Negative (0.2) mg/dL Final     POC Protein 2000 Negative mg/dL Final     POC Urine PH 8.0 5.0 - 8.0 Final     POC Blood LG Negative Final     POC Specific Gravity 1.010 <1.005 - >1.030 Final     POC Ketones NEG Negative mg/dL Final     POC Biliruben NEG Negative mg/dL Final     POC Glucose NEG Negative mg/dL Final         Last Resulted Time     Fri May 19, 2017  3:45 PM           Assessment/Plan:     1. Acute cystitis without hematuria  - POCT Urinalysis --> + leuks, +blood, + protein (>2000)  - URINE CULTURE(NEW); Future  - cephALEXin (KEFLEX) 500 MG Cap; Take 1 Cap by mouth 3 times a day for 7 days.  Dispense: 21 Cap; Refill: 0   - Complete full course of antibiotics as prescribed   - phenazopyridine (PYRIDIUM) 100 MG Tab; Take 1 Tab by mouth 3 times a day as needed for up to 3 days.  Dispense: 9 Tab; Refill: 0   - advised that it will turn urine orange in color  - Pt educated on preventative measures for avoiding future UTIs  - Advised to increase fluid intake  - Pending urine culture  - ER precautions given regarding pyelonephritis  including fevers greater than 101 and, vomiting and dehydration, increased back pain.      2. Proteinuria, unspecified type    Protein in urine has increased from 30 on urinalysis done 8/2016. Last GFR measured in 8/2016 was 51. Patient denies any known kidney disease, although stage II CKD is listed in his problem list. I advised him I want him to follow up with his PCP after treatment of UTI for reevaluation of proteinuria. He states he will set up an appointment for the next 1-2 weeks.

## 2017-05-19 NOTE — MR AVS SNAPSHOT
"Meng Snyder   2017 3:25 PM   Office Visit   MRN: [id number]    Department:  [facility]   Dept Phone:  [phone]    Description:  Male : 1935   Provider:  Leeann Roldan PA-C           Reason for Visit     Dysuria x 2 days painful urination       Allergies as of 2017     No Known Allergies      You were diagnosed with     Acute cystitis without hematuria   [017738]       Proteinuria, unspecified type   [9700076]         Vital Signs     Blood Pressure Pulse Temperature Respirations Height Weight    136/90 mmHg 102 36.3 °C (97.3 °F) 18 1.702 m (5' 7\") 73.483 kg (162 lb)    Body Mass Index Oxygen Saturation Smoking Status             25.37 kg/m2 96% Former Smoker         Basic Information     Date Of Birth Sex Race Ethnicity Preferred Language    1935 Male White Non- English      Your appointments     May 23, 2017 10:00 AM   Pulmonary Rehab Class with PULMONARY REHAB PM CLASS   PULMONARY LAB OP [facility] (--)    [address] [facility] [address]   [phone]            May 25, 2017 10:00 AM   Pulmonary Rehab Class with PULMONARY REHAB PM CLASS   PULMONARY LAB OP [facility] (--)    [address] [facility] [address]   [phone]            May 30, 2017 10:00 AM   Pulmonary Rehab Class with PULMONARY REHAB PM CLASS   PULMONARY LAB OP [facility] (--)    [address] [facility] [address]   [phone]            2017 10:00 AM   Pulmonary Rehab Class with PULMONARY REHAB PM CLASS   PULMONARY LAB OP [facility] (--)    [address] [facility] [address]   [phone]            2017 10:00 AM   Pulmonary Rehab Class with PULMONARY REHAB PM CLASS   PULMONARY LAB OP [facility] (--)    [address] [facility] [address]   [phone]            2017 10:00 AM   Pulmonary Rehab Class with PULMONARY REHAB PM CLASS   PULMONARY LAB OP [facility] (--)    [address] [facility] [address]   [phone]            2017 10:00 AM   Pulmonary Rehab Class with PULMONARY " REHAB PM CLASS   PULMONARY LAB OP [facility] (--)    [address] [facility] [address]   [phone]            Efra 15, 2017 10:00 AM   Pulmonary Rehab Class with PULMONARY REHAB PM CLASS   PULMONARY LAB OP [facility] (--)    [address] [facility] [place] [address]   [phone]            Jun 20, 2017  1:00 PM   Pulmonary Rehab Class with PULMONARY REHAB PM CLASS   PULMONARY LAB OP [facility] (--)    [address] [facility] [address]   [phone]            Dec 11, 2017 11:00 AM   FOLLOW UP with Vincent Dumont M.D.   [facility]    [address]  [address]  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 High  5/7/2015 - Present    Chronic respiratory failure (CMS-HCC) J96.10 Low  5/7/2015 - Present    Tremor R25.1   6/26/2015 - Present    Headache R51   8/22/2016 - Present    Mass of lower lobe of left lung R91.8   8/22/2016 - Present    CVA (cerebral vascular accident) (CMS-HCC) I63.9 High  8/23/2016 - Present    Atrial fibrillation (CMS-HCC) I48.91 Medium  8/23/2016 - Present    CKD (chronic kidney disease), 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 Low  8/23/2016 - Present    Dyslipidemia E78.5 Low  8/23/2016 - Present    Late effect of stroke I69.30   9/8/2016 - Present      Health Maintenance        Date Due Completion Dates    IMM ZOSTER VACCINE 9/17/1997 ---    COLONOSCOPY 5/8/2016 5/8/2011 (Done)    Override on 5/8/2011: Done    IMM DTaP/Tdap/Td Vaccine (2 - Td) 5/8/2022 5/8/2012            Results      POCT Urinalysis      Component Value Standard Range & Units    POC Color ORANGE Negative    POC Appearance CLOUDY Negative    POC Leukocyte Esterase LG Negative    POC Nitrites NEG Negative    POC Urobiligen 2 Negative (0.2) mg/dL    POC Protein 2000 Negative mg/dL    POC Urine PH 8.0 5.0 - 8.0    POC Blood LG Negative    POC Specific Gravity 1.010 <1.005 - >1.030    POC Ketones NEG Negative mg/dL    POC Biliruben NEG Negative mg/dL    POC Glucose NEG Negative mg/dL                        Current Immunizations     13-VALENT PCV PREVNAR 9/19/2016 11:18 AM    Hepatitis B Vaccine Non-Recombivax (Ped/Adol) 11/19/2012    Influenza TIV (IM) 10/1/2016, 10/1/2015, 9/26/2014    Pneumococcal polysaccharide vaccine (PPSV-23) 12/1/2014    Tdap Vaccine 5/8/2012      Below and/or attached are the medications your provider expects you to take. Review all of your home medications and newly ordered medications with your provider and/or pharmacist. Follow medication instructions as directed by your provider and/or pharmacist. Please keep your medication list with you and share with your provider. Update the information when medications are discontinued, doses are changed, or new medications (including over-the-counter products) are added; and carry medication information at all times in the event of emergency situations     Allergies:  No Known Allergies          Medications  Valid as of: May 19, 2017 -  4:39 PM    Generic Name Brand Name Tablet Size Instructions for use    Albuterol Sulfate (Aero Soln) albuterol 108 (90 BASE) MCG/ACT Inhale 2 Puffs by mouth every 6 hours as needed. Indications: Asthma        Albuterol Sulfate (Nebu Soln) PROVENTIL 2.5mg/0.5ml 0.5 mL by Nebulization route 2 Times a Day.        Albuterol Sulfate (Nebu Soln) PROVENTIL 2.5mg/3ml         Apixaban (Tab) ELIQUIS 5mg Take 1 Tab by mouth 2 Times a Day.        Atorvastatin Calcium (Tab) LIPITOR 40 MG Take 1 Tab by mouth every bedtime.        Azithromycin  (Tab) ZITHROMAX 250 MG Take as directed        Cephalexin (Cap) KEFLEX 500 MG Take 1 Cap by mouth 3 times a day for 7 days.        Cholecalciferol (Tab) cholecalciferol 1000 UNIT Take 2,000 Units by mouth every evening.        Dutasteride (Cap) AVODART 0.5 MG Take 1 Cap by mouth every evening.        Fluticasone-Salmeterol (AEROSOL POWDER, BREATH ACTIVATED) ADVAIR 500-50 MCG/DOSE Inhale 1 Puff by mouth every 12 hours.        Folic Acid (Tab) FOLVITE 1 MG Take 2 Tabs by mouth every evening.        Methotrexate Sodium (Tab) methotrexate 2.5 MG Take 2 Tabs by mouth every 7 days. Unknown what day he takes this.        MethylPREDNISolone (Tab) MEDROL 4 MG Take 4 mg by mouth every evening. COPD - longer term treatment        MethylPREDNISolone (Tab) MEDROL 4 MG Take 1 Tab by mouth every day.        Multiple Vitamins-Minerals   Take 1 Tab by mouth every evening.        Phenazopyridine HCl (Tab) PYRIDIUM 100 MG Take 1 Tab by mouth 3 times a day as needed for up to 3 days.        PredniSONE (Tab) DELTASONE 5 MG Take 1 Tab by mouth every day.        PredniSONE (Tab) DELTASONE 10 MG Take 30mg x 3 days, then take 20mg x 3 days, then take 10mg x 3 days, with food, then discontinue.        Terazosin HCl (Cap) HYTRIN 5 MG Take 1 Cap by mouth every day.        Tiotropium Bromide Monohydrate (Cap) SPIRIVA 18 MCG Inhale 1 Cap by mouth every day.        Triamterene-HCTZ (Tab) MAXZIDE-25/DYAZIDE 37.5-25 MG Take 1 Tab by mouth every day.        .                 Medicines prescribed today were sent to:     [id number] - [place] - [address]    [address] [facility] [place] [address]    Phone: [phone] Fax: [phone]    Open 24 Hours?: No    [facility]    [address] [facility] [address] [facility] [address]    Phone: [phone] Fax: [phone]      Medication refill instructions:       If your prescription bottle indicates you have medication refills left, it is not necessary to call your provider’s office.  Please contact your pharmacy and they will refill your medication.    If your prescription bottle indicates you do not have any refills left, you may request refills at any time through one of the following ways: The online FanMob system (except Urgent Care), by calling your provider’s office, or by asking your pharmacy to contact your provider’s office with a refill request. Medication refills are processed only during regular business hours and may not be available until the next business day. Your provider may request additional information or to have a follow-up visit with you prior to refilling your medication.   *Please Note: Medication refills are assigned a new Rx number when refilled electronically. Your pharmacy may indicate that no refills were authorized even though a new prescription for the same medication is available at the pharmacy. Please request the medicine by name with the pharmacy before contacting your provider for a refill.        Your To Do List     Future Labs/Procedures Complete By Expires    URINE CULTURE(NEW)  As directed 5/19/2018      Other Notes About Your Plan     Patient Enrolled in Medical Home with Dr. Gil.   Please assess and status chronic disease from prior visits.    No queries.           FanMob Access Code: Activation code not generated  Current FanMob Status: Active

## 2017-06-05 NOTE — TELEPHONE ENCOUNTER
Future Appointments       Provider Department Center    6/6/2017 10:00 AM PULMONARY REHAB PM CLASS [facility] LAB [facility]     6/6/2017 1:20 PM Ric Gil M.D. [facility] [address]    6/8/2017 10:00 AM PULMONARY REHAB PM CLASS [facility] LAB [facility]     6/13/2017 10:00 AM PULMONARY REHAB PM CLASS [facility]ARY LAB [facility]     6/15/2017 10:00 AM PULMONARY REHAB PM CLASS [facility]ARY LAB OP [facility]     6/20/2017 1:00 PM PULMONARY REHAB PM CLASS [facility]UL[facility]ARY LAB OP [facility]     12/11/2017 11:00 AM Vincent Dumont M.D. [facility]         ESTABLISHED PATIENT PRE-VISIT PLANNING     Note: Patient will not be contacted if there is no indication to call.     1.  Reviewed note from last office visit with PCP and/or other med group provider: Yes    2.  If any orders were placed at last visit, do we have Results/Consult Notes?        •  Labs - Labs were not ordered at last office visit.       •  Imaging - Imaging was not ordered at last office visit.       •  Referrals - No referrals were ordered at last office visit.    3.  Immunizations were updated in SmartHub using WebIZ?: Yes       •  Web Iz Recommendations: HEPATITIS A  HEPATITIS B MMR  TD ZOSTAVAX (Shingles)    4.  Patient is due for the following Health Maintenance Topics:   Health Maintenance Due   Topic Date Due   • IMM ZOSTER VACCINE  09/17/1997   • COLONOSCOPY  05/08/2016           5.  Patient was not informed to arrive 15 min prior to their scheduled appointment and bring in their medication bottles.

## 2017-06-06 NOTE — MR AVS SNAPSHOT
"Meng Snyder   2017 1:20 PM   Office Visit   MRN: [id number]    Department:  [address] [place] [address]   Dept Phone:  [phone]    Description:  Male : 1935   Provider:  Ric Gil M.D.           Reason for Visit     Dysuria burning, frequent urination, fv uc visit      Allergies as of 2017     No Known Allergies      You were diagnosed with     Dysuria   [788.1.ICD-9-CM]       Recurrent UTI   [795407]       Acute cystitis without hematuria   [654904]         Vital Signs     Blood Pressure Pulse Temperature Respirations Height Weight    112/64 mmHg 89 36.4 °C (97.5 °F) 16 1.702 m (5' 7.01\") 70.58 kg (155 lb 9.6 oz)    Body Mass Index Oxygen Saturation Smoking Status             24.36 kg/m2 95% Former Smoker         Basic Information     Date Of Birth Sex Race Ethnicity Preferred Language    1935 Male White Non- English      Your appointments     2017 10:00 AM   Pulmonary Rehab Class with PULMONARY REHAB PM CLASS   PULMONARY LAB OP [facility] (--)    [address] [facility] [address]   [phone]            2017 10:00 AM   Pulmonary Rehab Class with PULMONARY REHAB PM CLASS   PULMONARY LAB OP [facility] (--)    [address] [facility] [address]   [phone]            Efra 15, 2017 10:00 AM   Pulmonary Rehab Class with PULMONARY REHAB PM CLASS   PULMONARY LAB OP [facility] (--)    [address] [facility] [address]   [phone]            2017  1:00 PM   Pulmonary Rehab Class with PULMONARY REHAB PM CLASS   PULMONARY LAB OP [facility] (--)    [address] [facility] [address]   [phone]            Dec 11, 2017 11:00 AM   FOLLOW UP with Vincent Dumont M.D.   [facility] (--)    [address]  [address]  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 High  5/7/2015 - Present    Chronic respiratory failure (CMS-HCC) J96.10 Low  5/7/2015 - Present    Tremor R25.1   6/26/2015 - Present    Headache R51   8/22/2016 - Present    Mass of lower lobe of left lung R91.8   8/22/2016 - Present    CVA (cerebral vascular accident) (CMS-HCC) I63.9 High  8/23/2016 - Present    Atrial fibrillation (CMS-HCC) I48.91 Medium  8/23/2016 - Present    CKD (chronic kidney disease), 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 Low  8/23/2016 - Present    Dyslipidemia E78.5 Low  8/23/2016 - Present    Late effect of stroke I69.30   9/8/2016 - Present      Health Maintenance        Date Due Completion Dates    IMM ZOSTER VACCINE 6/14/2023 (Originally 9/17/1997) ---    IMM DTaP/Tdap/Td Vaccine (2 - Td) 5/8/2022 5/8/2012            Results     POCT Urinalysis      Component Value Standard Range & Units    POC Color yellow Negative    POC Appearance cloudy Negative    POC Leukocyte Esterase large Negative    POC Nitrites negative Negative    POC Urobiligen 0.2 Negative (0.2) mg/dL    POC Protein 100 Negative mg/dL    POC Urine PH 7.0 5.0 - 8.0    POC Blood moderate Negative    POC Specific Gravity 1.015 <1.005 - >1.030    POC Ketones neg Negative mg/dL    POC Biliruben neg Negative mg/dL    POC Glucose neg Negative mg/dL                        Current Immunizations     13-VALENT PCV PREVNAR 9/19/2016 11:18 AM    Hepatitis B Vaccine Non-Recombivax (Ped/Adol) 11/19/2012    Influenza TIV (IM) 10/1/2016, 10/1/2015, 9/26/2014    Pneumococcal polysaccharide vaccine (PPSV-23) 12/1/2014    Tdap Vaccine 5/8/2012      Below and/or attached are the medications your provider expects you to take.  Review all of your home medications and newly ordered medications with your provider and/or pharmacist. Follow medication instructions as directed by your provider and/or pharmacist. Please keep your medication list with you and share with your provider. Update the information when medications are discontinued, doses are changed, or new medications (including over-the-counter products) are added; and carry medication information at all times in the event of emergency situations     Allergies:  No Known Allergies          Medications  Valid as of: June 06, 2017 -  1:45 PM    Generic Name Brand Name Tablet Size Instructions for use    Albuterol Sulfate (Aero Soln) albuterol 108 (90 BASE) MCG/ACT Inhale 2 Puffs by mouth every 6 hours as needed. Indications: Asthma        Albuterol Sulfate (Nebu Soln) PROVENTIL 2.5mg/0.5ml 0.5 mL by Nebulization route 2 Times a Day.        Apixaban (Tab) ELIQUIS 5mg Take 1 Tab by mouth 2 Times a Day.        Atorvastatin Calcium (Tab) LIPITOR 40 MG Take 1 Tab by mouth every bedtime.        Azithromycin (Tab) ZITHROMAX 250 MG Take as directed        Cholecalciferol (Tab) cholecalciferol 1000 UNIT Take 2,000 Units by mouth every evening.        Dutasteride (Cap) AVODART 0.5 MG Take 1 Cap by mouth every evening.        Fluticasone-Salmeterol (AEROSOL POWDER, BREATH ACTIVATED) ADVAIR 500-50 MCG/DOSE Inhale 1 Puff by mouth every 12 hours.        Folic Acid (Tab) FOLVITE 1 MG Take 2 Tabs by mouth every evening.        Methotrexate Sodium (Tab) methotrexate 2.5 MG Take 2 Tabs by mouth every 7 days. Unknown what day he takes this.        MethylPREDNISolone (Tab) MEDROL 4 MG Take 1 Tab by mouth every day.        Multiple Vitamins-Minerals   Take 1 Tab by mouth every evening.        Phenazopyridine HCl (Tab) PYRIDIUM 100 MG Take 1 Tab by mouth 3 times a day as needed for up to 3 days.        PredniSONE (Tab) DELTASONE 5 MG Take 1 Tab by mouth every day.        Sulfamethoxazole-Trimethoprim (Tab)  BACTRIM -160 MG Take 1 Tab by mouth 2 times a day for 10 days.        Terazosin HCl (Cap) HYTRIN 5 MG Take 1 Cap by mouth every day.        Tiotropium Bromide Monohydrate (Cap) SPIRIVA 18 MCG Inhale 1 Cap by mouth every day.        Triamterene-HCTZ (Tab) MAXZIDE-25/DYAZIDE 37.5-25 MG Take 1 Tab by mouth every day.        .                 Medicines prescribed today were sent to:     [facility] - [place], [place] - [address]    [address] [facility] [address]    Phone: [phone] Fax: [phone]    Open 24 Hours?: No    [facility]    [address] [facility] [address] [facility] [address]    Phone: [phone] Fax: [phone]      Medication refill instructions:       If your prescription bottle indicates you have medication refills left, it is not necessary to call your provider’s office. Please contact your pharmacy and they will refill your medication.    If your prescription bottle indicates you do not have any refills left, you may request refills at any time through one of the following ways: The online Schmoozer system (except Urgent Care), by calling your provider’s office, or by asking your pharmacy to contact your provider’s office with a refill request. Medication refills are processed only during regular business hours and may not be available until the next business day. Your provider may request additional information or to have a follow-up visit with you prior to refilling your medication.   *Please Note: Medication refills are assigned a new Rx number when refilled electronically. Your pharmacy may indicate that no refills were authorized even though a new prescription for the same medication is available at the pharmacy. Please request the medicine by name with the pharmacy before contacting your provider for a refill.        Your To Do List     Future Labs/Procedures Complete By Expires    URINE CULTURE(NEW)  As directed 6/6/2018      Referral     A referral request has been  sent to our patient care coordination department. Please allow 3-5 business days for us to process this request and contact you either by phone or mail. If you do not hear from us by the 5th business day, please call us at [phone].        Other Notes About Your Plan     Patient Enrolled in Medical Home with Dr. Gil.   Please assess and status chronic disease from prior visits.    No queries.           simplifyMD Access Code: Activation code not generated  Current simplifyMD Status: Active

## 2017-06-06 NOTE — PROGRESS NOTES
CC: Dysuria    HPI:   Meng presents today with the following.    1. Dysuria  Presents after being seen in urgent care with dysuria. He was placed on third-generation cephalosporin. He reports his symptoms never completely resolved and when stopping antibiotics 2 days later he began having severe pain and frequency with urination. He denies any fevers chills or flank pain. He is having some hesitancy.      Patient Active Problem List    Diagnosis Date Noted   • CVA (cerebral vascular accident) (CMS-HCC) 08/23/2016     Priority: High   • Other chronic pulmonary heart diseases 05/07/2015     Priority: High   • Atrial fibrillation (CMS-HCC) 08/23/2016     Priority: Medium   • CKD (chronic kidney disease), stage II 08/23/2016     Priority: Low   • Dyslipidemia 08/23/2016     Priority: Low   • Chronic respiratory failure (CMS-HCC) 05/07/2015     Priority: Low   • COPD (chronic obstructive pulmonary disease) (CMS-HCC) 12/28/2014     Priority: Low   • BPH (benign prostatic hyperplasia) 06/03/2013     Priority: Low   • HTN (hypertension), benign 05/08/2012     Priority: Low   • Rheumatoid arthritis (CMS-HCC) 05/08/2012     Priority: Low   • Late effect of stroke 09/08/2016   • Headache 08/22/2016   • Mass of lower lobe of left lung 08/22/2016   • Tremor 06/26/2015   • Family history of colon cancer 05/08/2012       Current Outpatient Prescriptions   Medication Sig Dispense Refill   • sulfamethoxazole-trimethoprim (BACTRIM DS) 800-160 MG tablet Take 1 Tab by mouth 2 times a day for 10 days. 20 Tab 0   • phenazopyridine (PYRIDIUM) 100 MG Tab Take 1 Tab by mouth 3 times a day as needed for up to 3 days. 15 Tab 0   • folic acid (FOLVITE) 1 MG Tab Take 2 Tabs by mouth every evening. 180 Tab 3   • triamterene-hctz (MAXZIDE-25/DYAZIDE) 37.5-25 MG Tab Take 1 Tab by mouth every day. 30 Tab 11   • azithromycin (ZITHROMAX) 250 MG Tab Take as directed (Patient not taking: Reported on 4/21/2017) 6 Tab 0   • apixaban (ELIQUIS) 5mg Tab  "Take 1 Tab by mouth 2 Times a Day. 180 Tab 1   • predniSONE (DELTASONE) 5 MG Tab Take 1 Tab by mouth every day. 90 Tab 3   • fluticasone-salmeterol (ADVAIR DISKUS) 500-50 MCG/DOSE AEROSOL POWDER, BREATH ACTIVATED Inhale 1 Puff by mouth every 12 hours. 3 Inhaler 3   • methotrexate 2.5 MG Tab Take 2 Tabs by mouth every 7 days. Unknown what day he takes this. 10 Tab 6   • albuterol (PROVENTIL) 2.5mg/0.5ml Nebu Soln 0.5 mL by Nebulization route 2 Times a Day. 375 mL 5   • dutasteride (AVODART) 0.5 MG capsule Take 1 Cap by mouth every evening. 90 Cap 3   • terazosin (HYTRIN) 5 MG Cap Take 1 Cap by mouth every day. 30 Cap 11   • methylPREDNISolone (MEDROL) 4 MG Tab Take 1 Tab by mouth every day. (Patient not taking: Reported on 4/21/2017) 90 Tab 3   • tiotropium (SPIRIVA) 18 MCG Cap Inhale 1 Cap by mouth every day. 90 Cap 3   • albuterol 108 (90 BASE) MCG/ACT Aero Soln inhalation aerosol Inhale 2 Puffs by mouth every 6 hours as needed. Indications: Asthma 8.5 g 5   • atorvastatin (LIPITOR) 40 MG Tab Take 1 Tab by mouth every bedtime. (Patient not taking: Reported on 12/20/2016) 30 Tab 6   • Multiple Vitamins-Minerals (MULTIVITAMIN PO) Take 1 Tab by mouth every evening.     • vitamin D (CHOLECALCIFEROL) 1000 UNIT TABS Take 2,000 Units by mouth every evening.       No current facility-administered medications for this visit.         Allergies as of 06/06/2017   • (No Known Allergies)        ROS: As per HPI.    /64 mmHg  Pulse 89  Temp(Src) 36.4 °C (97.5 °F)  Resp 16  Ht 1.702 m (5' 7.01\")  Wt 70.58 kg (155 lb 9.6 oz)  BMI 24.36 kg/m2  SpO2 95%    Physical Exam:  Gen:         Alert and oriented, No apparent distress.  Neck:        No Lymphadenopathy or Bruits.  Lungs:     Clear to auscultation bilaterally  CV:          Regular rate and rhythm. No murmurs, rubs or gallops.               Ext:          No clubbing, cyanosis, edema.      Assessment and Plan.   81 y.o. male with the following issues.    1. " Dysuria  Have switch to Bactrim setting for cultures. Repeat in office today does so heavily positive for esterase. His symptoms continue have placed referral to urology given the difficulty of treatment.  - POCT Urinalysis  - URINE CULTURE(NEW); Future  - sulfamethoxazole-trimethoprim (BACTRIM DS) 800-160 MG tablet; Take 1 Tab by mouth 2 times a day for 10 days.  Dispense: 20 Tab; Refill: 0  - REFERRAL TO UROLOGY  - phenazopyridine (PYRIDIUM) 100 MG Tab; Take 1 Tab by mouth 3 times a day as needed for up to 3 days.  Dispense: 15 Tab; Refill: 0

## 2017-06-14 NOTE — TELEPHONE ENCOUNTER
Pt called stated that he was very sob and needed to make a sooner ov than 6/22/17, I encouraged the pt to be seen by UC/ ER if he felt that he was couldn't breath, he stated that he was functioning and that he would have his PCP take care of it and hung up.

## 2017-06-15 NOTE — PATIENT INSTRUCTIONS
Plan:    1) Chest xray today in the office indicates; Left lung mass again identified which appears slightly smaller than on the prior exam. Probable small amount of pleural fluid versus pleural thickening noted in the left major fissure. Minimal blunting of left costophrenic angle is again noted. No new infiltrates or consolidations are identified.  2) Complete current course of Medrol. He is on Septra for a UTI. He will complete this. I did provided him emergency scripts for Augmentin and a Prednisone taper to have on hand. He does not feel Azithromycin works well for him. He is encouraged to use a Probiotic when on the antibiotic.  3) Continue Mucinex OTC. Continue routine nebulizer treatments.  4) Continue Advair and Spiriva inhalers.  5) Continue 02 3-4 LPM 24/7.  6) Add Lasix 20 mg daily for the next 3 mornings. He is encouraged to contact his Cardiologist for further diuresis management as discussed at their last appointment. I will order BNP and BMP now.   7) He has a large left lung mass with unclear etiology. Several prior non diagnostic biopsies. He is not felt to be a surgical candidate. Continue to monitor with radiographic follow up. Today's chest xray shows slightly smaller.   8) Continue with Pulmonary rehab.   9) He is up to date on Pneumovax 23 and Prevnar 13 vaccines. Recommend an updated Influenza vaccine in the Fall.   10) Follow up with PCP for ongoing UTI management.   11) 6 week follow up, sooner if needed.

## 2017-06-15 NOTE — MR AVS SNAPSHOT
"Meng Snyder   6/15/2017 12:40 PM   Office Visit   MRN: [id number]    Department:  [facility]   Dept Phone:  [phone]    Description:  Male : 1935   Provider:  MARGARITA Arredondo           Reason for Visit     Shortness of Breath     Other has attended pulmonary rehab      Allergies as of 6/15/2017     No Known Allergies      You were diagnosed with     COPD with exacerbation (CMS-HCC)   [164308]       Chronic respiratory failure with hypoxia (CMS-[facility])   [337117]       Lung mass   [423620]       Edema of both legs   [626026]         Vital Signs     Blood Pressure Pulse Respirations Height Weight Body Mass Index    122/70 mmHg 112 18 1.727 m (5' 7.99\") 77.111 kg (170 lb) 25.85 kg/m2    Oxygen Saturation Smoking Status                89% Former Smoker          Basic Information     Date Of Birth Sex Race Ethnicity Preferred Language    1935 Male White Non- English      Your appointments     2017  9:20 AM   Established Patient with Ric Gil M.D.   [facility] [address]    [address] [facility] [address]  [facility] [address] [phone]           You will be receiving a confirmation call a few days before your appointment from our automated call confirmation system.            2017  1:00 PM   Pulmonary Rehab Class with PULMONARY REHAB PM CLASS   PULMONARY LAB OP [facility] (--)    [address] [facility] [place] [address] [phone]            2017  4:20 PM   Established Patient Pul with MARGARITA Arredondo   [facility]monary Medicine (--)    [address]  [place] [address] [phone]            Dec 11, 2017 11:00 AM   FOLLOW UP with Vincent Dumont M.D.   [facility]    [address]  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 High  5/7/2015 - Present    Chronic respiratory failure (CMS-HCC) J96.10 Low  5/7/2015 - Present    Tremor R25.1   6/26/2015 - Present    Headache R51   8/22/2016 - Present    Mass of lower lobe of left lung R91.8   8/22/2016 - Present    CVA (cerebral vascular accident) (CMS-HCC) I63.9 High  8/23/2016 - Present    Atrial fibrillation (CMS-HCC) I48.91 Medium  8/23/2016 - Present    CKD (chronic kidney disease), 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 Low  8/23/2016 - Present    Dyslipidemia E78.5 Low  8/23/2016 - Present    Late effect of stroke I69.30   9/8/2016 - Present      Health Maintenance        Date Due Completion Dates    IMM ZOSTER VACCINE 6/14/2023 (Originally 9/17/1997) ---    IMM DTaP/Tdap/Td Vaccine (2 - Td) 5/8/2022 5/8/2012            Current Immunizations     13-VALENT PCV PREVNAR 9/19/2016 11:18 AM    Hepatitis B Vaccine Non-Recombivax (Ped/Adol) 11/19/2012    Influenza TIV (IM) 10/1/2016, 10/1/2015, 9/26/2014    Pneumococcal polysaccharide vaccine (PPSV-23) 12/1/2014    Tdap Vaccine 5/8/2012      Below and/or attached are the medications your provider expects you to take. Review all of your home medications and newly ordered medications with your provider and/or pharmacist. Follow medication instructions as directed by your provider and/or pharmacist. Please keep your medication list with you and share with your provider. Update the information when medications are discontinued, doses are changed, or new medications (including over-the-counter products) are added; and carry medication information at all times in the  event of emergency situations     Allergies:  No Known Allergies          Medications  Valid as of: Roz 15, 2017 -  1:44 PM    Generic Name Brand Name Tablet Size Instructions for use    Albuterol Sulfate (Aero Soln) albuterol 108 (90 BASE) MCG/ACT Inhale 2 Puffs by mouth every 6 hours as needed. Indications: Asthma        Albuterol Sulfate (Nebu Soln) PROVENTIL 2.5mg/0.5ml 0.5 mL by Nebulization route 2 Times a Day.        Amoxicillin-Pot Clavulanate (Tab) AUGMENTIN 875-125 MG Take 1 Tab by mouth 2 times a day, with meals.        Apixaban (Tab) ELIQUIS 5mg Take 1 Tab by mouth 2 Times a Day.        Atorvastatin Calcium (Tab) LIPITOR 40 MG Take 1 Tab by mouth every bedtime.        Cholecalciferol (Tab) cholecalciferol 1000 UNIT Take 2,000 Units by mouth every evening.        Dutasteride (Cap) AVODART 0.5 MG Take 1 Cap by mouth every evening.        Fluticasone-Salmeterol (AEROSOL POWDER, BREATH ACTIVATED) ADVAIR 500-50 MCG/DOSE Inhale 1 Puff by mouth every 12 hours.        Folic Acid (Tab) FOLVITE 1 MG Take 2 Tabs by mouth every evening.        Furosemide (Tab) LASIX 20 MG 1 po daily x 3 mornings then as needed        Methotrexate Sodium (Tab) methotrexate 2.5 MG Take 2 Tabs by mouth every 7 days. Unknown what day he takes this.        MethylPREDNISolone (Tab) MEDROL 4 MG Take 1 Tab by mouth every day.        Multiple Vitamins-Minerals   Take 1 Tab by mouth every evening.        PredniSONE (Tab) DELTASONE 5 MG Take 1 Tab by mouth every day.        PredniSONE (Tab) DELTASONE 10 MG Take 30mg x 3 days, then take 20mg x 3 days, then take 10mg x 3 days, with food and as directed        Sulfamethoxazole-Trimethoprim (Tab) BACTRIM -160 MG Take 1 Tab by mouth 2 times a day for 10 days.        Terazosin HCl (Cap) HYTRIN 5 MG Take 1 Cap by mouth every day.        Tiotropium Bromide Monohydrate (Cap) SPIRIVA 18 MCG Inhale 1 Cap by mouth every day.        Triamterene-HCTZ (Tab) MAXZIDE-25/DYAZIDE 37.5-25 MG Take 1 Tab by  mouth every day.        .                 Medicines prescribed today were sent to:     [facility] - [place], [place] - [address]    [address] [place] [address]    Phone: [phone] Fax: [phone]    Open 24 Hours?: No    [facility] [place]    [address] [place] [address]    Phone: [phone] Fax: [phone]      Medication refill instructions:       If your prescription bottle indicates you have medication refills left, it is not necessary to call your provider’s office. Please contact your pharmacy and they will refill your medication.    If your prescription bottle indicates you do not have any refills left, you may request refills at any time through one of the following ways: The online [employer] system (except Urgent Care), by calling your provider’s office, or by asking your pharmacy to contact your provider’s office with a refill request. Medication refills are processed only during regular business hours and may not be available until the next business day. Your provider may request additional information or to have a follow-up visit with you prior to refilling your medication.   *Please Note: Medication refills are assigned a new Rx number when refilled electronically. Your pharmacy may indicate that no refills were authorized even though a new prescription for the same medication is available at the pharmacy. Please request the medicine by name with the pharmacy before contacting your provider for a refill.        Instructions    Plan:    1) Chest xray today in the office indicates; Left lung mass again identified which appears slightly smaller than on the prior exam. Probable small amount of pleural fluid versus pleural thickening noted in the left major fissure. Minimal blunting of left costophrenic angle is again noted. No new infiltrates or consolidations are identified.  2) Complete current course of Medrol. He is on Septra for a UTI. He will complete this. I  did provided him emergency scripts for Augmentin and a Prednisone taper to have on hand. He does not feel Azithromycin works well for him. He is encouraged to use a Probiotic when on the antibiotic.  3) Continue Mucinex OTC. Continue routine nebulizer treatments.  4) Continue Advair and Spiriva inhalers.  5) Continue 02 3-4 LPM 24/7.  6) Add Lasix 20 mg daily for the next 3 mornings. He is encouraged to contact his Cardiologist for further diuresis management as discussed at their last appointment. I will order BNP and BMP now.   7) He has a large left lung mass with unclear etiology. Several prior non diagnostic biopsies. He is not felt to be a surgical candidate. Continue to monitor with radiographic follow up. Today's chest xray shows slightly smaller.   8) Continue with Pulmonary rehab.   9) He is up to date on Pneumovax 23 and Prevnar 13 vaccines. Recommend an updated Influenza vaccine in the Fall.   10) Follow up with PCP for ongoing UTI management.   11) 6 week follow up, sooner if needed.        Other Notes About Your Plan     Patient Enrolled in Medical Home with Dr. Gil.   Please assess and status chronic disease from prior visits.    No queries.           [email or web address] Access Code: Activation code not generated  Current [email or web address] Status: Active

## 2017-06-16 NOTE — MR AVS SNAPSHOT
"Meng Snyder   2017 9:20 AM   Office Visit   MRN: [id number]    Department:  [address] [place] [address]   Dept Phone:  [phone]    Description:  Male : 1935   Provider:  Ric Gil M.D.           Allergies as of 2017     No Known Allergies      You were diagnosed with     SOB (shortness of breath)   [413983]       Leg swelling   [421060]       Dysuria   [788.1.ICD-9-CM]         Vital Signs     Blood Pressure Pulse Temperature Respirations Height Weight    104/66 mmHg 83 36.8 °C (98.2 °F) 16 1.727 m (5' 7.99\") 68.312 kg (150 lb 9.6 oz)    Body Mass Index Oxygen Saturation Smoking Status             22.90 kg/m2 89% Former Smoker         Basic Information     Date Of Birth Sex Race Ethnicity Preferred Language    1935 Male White Non- English      Your appointments     2017 10:00 AM   Pulmonary Rehab Class with PULMONARY REHAB PM CLASS   PULMONARY LAB OP [facility] (--)    [address] [facility]  [place] [address]   [phone]            2017 10:00 AM   Pulmonary Rehab Class with PULMONARY REHAB PM CLASS   PULMONARY LAB OP [facility] (--)    [address] [facility]  [place] [address]   [phone]            2017 10:00 AM   Pulmonary Rehab Class with PULMONARY REHAB PM CLASS   PULMONARY LAB OP [facility] (--)    [address] [facility]  [place] [address]   [phone]            2017  1:40 PM   Established Patient with Ric Gil M.D.   [facility] [address]    [address] [facility] [address]  [place] [address] [phone]           You will be receiving a confirmation call a few days before your appointment from our automated call confirmation system.            2017 10:00 AM   Pulmonary Rehab Class with PULMONARY REHAB PM CLASS   PULMONARY LAB OP [facility] (--)    [address] [facility]  [place] [address]   [phone]            2017 10:00 AM   Pulmonary Rehab Class with PULMONARY REHAB PM CLASS   PULMONARY LAB OP [facility] (--)    [address] [facility] [address]   [phone]            Jul 06, 2017 10:00 AM   Pulmonary Rehab Class with PULMONARY REHAB PM CLASS   PULMONARY LAB OP [facility] (--)    [address] [facility] [address]   [phone]            Jul 11, 2017 10:00 AM   Pulmonary Rehab Class with PULMONARY REHAB PM CLASS   PULMONARY LAB OP [facility] (--)    [address] [facility] [address]   [phone]            Jul 13, 2017 10:00 AM   Pulmonary Rehab Class with PULMONARY REHAB PM CLASS   PULMONARY LAB OP [facility] (--)    [address] [facility] [address]   [phone]            Jul 18, 2017 10:00 AM   Pulmonary Rehab Class with PULMONARY REHAB PM CLASS   PULMONARY LAB OP [facility] (--)    [address] [facility] [address]   [phone]            Jul 20, 2017 10:00 AM   Pulmonary Rehab Class with PULMONARY REHAB PM CLASS   PULMONARY LAB OP [facility] (--)    [address] [place] [address]   [phone]            Jul 27, 2017  4:20 PM   Established Patient Pul with MARGARITA Arredondo   [facility] (--)    [address]  [address]  [facility] [address]   [phone]            Dec 11, 2017 11:00 AM   FOLLOW UP with Vincent Dumont M.D.   [facility] (--)    [address]  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 High  5/7/2015 - Present    Chronic respiratory failure (CMS-HCC) J96.10 Low  5/7/2015 - Present    Tremor R25.1   6/26/2015 - Present     Headache R51   8/22/2016 - Present    Mass of lower lobe of left lung R91.8   8/22/2016 - Present    CVA (cerebral vascular accident) (CMS-HCC) I63.9 High  8/23/2016 - Present    Atrial fibrillation (CMS-HCC) I48.91 Medium  8/23/2016 - Present    CKD (chronic kidney disease), 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 Low  8/23/2016 - Present    Dyslipidemia E78.5 Low  8/23/2016 - Present    Late effect of stroke I69.30   9/8/2016 - Present      Health Maintenance        Date Due Completion Dates    IMM ZOSTER VACCINE 6/14/2023 (Originally 9/17/1997) ---    IMM DTaP/Tdap/Td Vaccine (2 - Td) 5/8/2022 5/8/2012            Current Immunizations     13-VALENT PCV PREVNAR 9/19/2016 11:18 AM    Hepatitis B Vaccine Non-Recombivax (Ped/Adol) 11/19/2012    Influenza TIV (IM) 10/1/2016, 10/1/2015, 9/26/2014    Pneumococcal polysaccharide vaccine (PPSV-23) 12/1/2014    Tdap Vaccine 5/8/2012      Below and/or attached are the medications your provider expects you to take. Review all of your home medications and newly ordered medications with your provider and/or pharmacist. Follow medication instructions as directed by your provider and/or pharmacist. Please keep your medication list with you and share with your provider. Update the information when medications are discontinued, doses are changed, or new medications (including over-the-counter products) are added; and carry medication information at all times in the event of emergency situations     Allergies:  No Known Allergies          Medications  Valid as of: June 16, 2017 -  9:41 AM    Generic Name Brand Name Tablet Size Instructions for use    Albuterol Sulfate (Aero Soln) albuterol 108 (90 BASE) MCG/ACT Inhale 2 Puffs by mouth every 6 hours as needed. Indications: Asthma        Albuterol Sulfate (Nebu Soln) PROVENTIL 2.5mg/0.5ml 0.5 mL by Nebulization route 2 Times a Day.        Apixaban (Tab) ELIQUIS 5mg Take 1 Tab by mouth 2 Times a Day.        Atorvastatin Calcium (Tab) LIPITOR 40 MG Take 1 Tab  by mouth every bedtime.        Cholecalciferol (Tab) cholecalciferol 1000 UNIT Take 2,000 Units by mouth every evening.        Dutasteride (Cap) AVODART 0.5 MG Take 1 Cap by mouth every evening.        Fluticasone-Salmeterol (AEROSOL POWDER, BREATH ACTIVATED) ADVAIR 500-50 MCG/DOSE Inhale 1 Puff by mouth every 12 hours.        Folic Acid (Tab) FOLVITE 1 MG Take 2 Tabs by mouth every evening.        LevoFLOXacin (Tab) LEVAQUIN 250 MG Take 1 Tab by mouth every day.        Methotrexate Sodium (Tab) methotrexate 2.5 MG Take 2 Tabs by mouth every 7 days. Unknown what day he takes this.        MethylPREDNISolone (Tab) MEDROL 4 MG Take 1 Tab by mouth every day.        Multiple Vitamins-Minerals   Take 1 Tab by mouth every evening.        PredniSONE (Tab) DELTASONE 5 MG Take 1 Tab by mouth every day.        PredniSONE (Tab) DELTASONE 10 MG Take 30mg x 3 days, then take 20mg x 3 days, then take 10mg x 3 days, with food and as directed        Terazosin HCl (Cap) HYTRIN 5 MG Take 1 Cap by mouth every day.        Tiotropium Bromide Monohydrate (Cap) SPIRIVA 18 MCG Inhale 1 Cap by mouth every day.        Triamterene-HCTZ (Tab) MAXZIDE-25/DYAZIDE 37.5-25 MG Take 1 Tab by mouth every day.        .                 Medicines prescribed today were sent to:     [facility] - [place], [place] - [facility] [address]    Phone: [phone] Fax: [phone]    Open 24 Hours?: No    [facility]    [address] [facility] [place] [place] [address] [facility] [address]    Phone: [phone] Fax: [phone]      Medication refill instructions:       If your prescription bottle indicates you have medication refills left, it is not necessary to call your provider’s office. Please contact your pharmacy and they will refill your medication.    If your prescription bottle indicates you do not have any refills left, you may request refills at any time through one of the following ways: The online [employer] system (except Urgent  Care), by calling your provider’s office, or by asking your pharmacy to contact your provider’s office with a refill request. Medication refills are processed only during regular business hours and may not be available until the next business day. Your provider may request additional information or to have a follow-up visit with you prior to refilling your medication.   *Please Note: Medication refills are assigned a new Rx number when refilled electronically. Your pharmacy may indicate that no refills were authorized even though a new prescription for the same medication is available at the pharmacy. Please request the medicine by name with the pharmacy before contacting your provider for a refill.        Your To Do List     Future Labs/Procedures Complete By Expires    BTYPE NATRIURETIC PEPTIDE  As directed 6/16/2018    CBC WITH DIFFERENTIAL  As directed 6/17/2018    COMP METABOLIC PANEL  As directed 6/17/2018    URINALYSIS,CULTURE IF INDICATED  As directed 6/17/2018      Other Notes About Your Plan     Patient Enrolled in Medical Home with Dr. Gil.   Please assess and status chronic disease from prior visits.    No queries.           [employer] Access Code: Activation code not generated  Current [employer] Status: Active

## 2017-06-16 NOTE — PROGRESS NOTES
CC: Follow-up multiple issues    HPI:   Meng presents today with the following.    1. SOB (shortness of breath)  Patient was seen in urgent care a few days ago had a chest x-ray showing no signs of pneumonia. His breathing is improved slightly last 2 days. He was ordered for some blood work as well but he is not completed.    2. Leg swelling  Leg swelling may be slightly worse than typical however wife reports not a significant change. He denies any orthopnea and again his breathing has improved.    3. Dysuria  He was treated initially with K flex as well as a subsequent third generation antibiotic by urgent care. Symptoms were not resolving so was put on an extended course of Bactrim. He reports his urinary symptoms did improve but have returned. He is also having tea-colored urine. He states that he is raking water appropriately no dehydration and again he does feel slightly better than 2 days ago but still has burning with urination.      Patient Active Problem List    Diagnosis Date Noted   • CVA (cerebral vascular accident) (CMS-HCC) 08/23/2016     Priority: High   • Other chronic pulmonary heart diseases 05/07/2015     Priority: High   • Atrial fibrillation (CMS-HCC) 08/23/2016     Priority: Medium   • CKD (chronic kidney disease), stage II 08/23/2016     Priority: Low   • Dyslipidemia 08/23/2016     Priority: Low   • Chronic respiratory failure (CMS-HCC) 05/07/2015     Priority: Low   • COPD (chronic obstructive pulmonary disease) (CMS-HCC) 12/28/2014     Priority: Low   • BPH (benign prostatic hyperplasia) 06/03/2013     Priority: Low   • HTN (hypertension), benign 05/08/2012     Priority: Low   • Rheumatoid arthritis (CMS-HCC) 05/08/2012     Priority: Low   • Late effect of stroke 09/08/2016   • Headache 08/22/2016   • Mass of lower lobe of left lung 08/22/2016   • Tremor 06/26/2015   • Family history of colon cancer 05/08/2012       Current Outpatient Prescriptions   Medication Sig Dispense Refill   •  "levofloxacin (LEVAQUIN) 250 MG Tab Take 1 Tab by mouth every day. 10 Tab 0   • predniSONE (DELTASONE) 10 MG Tab Take 30mg x 3 days, then take 20mg x 3 days, then take 10mg x 3 days, with food and as directed 30 Tab 2   • folic acid (FOLVITE) 1 MG Tab Take 2 Tabs by mouth every evening. 180 Tab 3   • triamterene-hctz (MAXZIDE-25/DYAZIDE) 37.5-25 MG Tab Take 1 Tab by mouth every day. 30 Tab 11   • apixaban (ELIQUIS) 5mg Tab Take 1 Tab by mouth 2 Times a Day. 180 Tab 1   • predniSONE (DELTASONE) 5 MG Tab Take 1 Tab by mouth every day. 90 Tab 3   • fluticasone-salmeterol (ADVAIR DISKUS) 500-50 MCG/DOSE AEROSOL POWDER, BREATH ACTIVATED Inhale 1 Puff by mouth every 12 hours. 3 Inhaler 3   • methotrexate 2.5 MG Tab Take 2 Tabs by mouth every 7 days. Unknown what day he takes this. 10 Tab 6   • albuterol (PROVENTIL) 2.5mg/0.5ml Nebu Soln 0.5 mL by Nebulization route 2 Times a Day. 375 mL 5   • dutasteride (AVODART) 0.5 MG capsule Take 1 Cap by mouth every evening. 90 Cap 3   • terazosin (HYTRIN) 5 MG Cap Take 1 Cap by mouth every day. 30 Cap 11   • methylPREDNISolone (MEDROL) 4 MG Tab Take 1 Tab by mouth every day. 90 Tab 3   • tiotropium (SPIRIVA) 18 MCG Cap Inhale 1 Cap by mouth every day. 90 Cap 3   • albuterol 108 (90 BASE) MCG/ACT Aero Soln inhalation aerosol Inhale 2 Puffs by mouth every 6 hours as needed. Indications: Asthma 8.5 g 5   • Multiple Vitamins-Minerals (MULTIVITAMIN PO) Take 1 Tab by mouth every evening.     • vitamin D (CHOLECALCIFEROL) 1000 UNIT TABS Take 2,000 Units by mouth every evening.     • atorvastatin (LIPITOR) 40 MG Tab Take 1 Tab by mouth every bedtime. (Patient not taking: Reported on 12/20/2016) 30 Tab 6     No current facility-administered medications for this visit.         Allergies as of 06/16/2017   • (No Known Allergies)        ROS: As per HPI.    /66 mmHg  Pulse 83  Temp(Src) 36.8 °C (98.2 °F)  Resp 16  Ht 1.727 m (5' 7.99\")  Wt 68.312 kg (150 lb 9.6 oz)  BMI 22.90 kg/m2  " SpO2 89%    Physical Exam:  Gen:         Alert and oriented, No apparent distress.  Neck:        No Lymphadenopathy or Bruits.  Lungs:     Clear to auscultation bilaterally  CV:          Regular rate and rhythm. No murmurs, rubs or gallops.               Ext:          No clubbing, cyanosis, 1+ edema.      Assessment and Plan.   81 y.o. male with the following issues.    1. SOB (shortness of breath)  6 has improved to some degree have given ER precautions for any worsening.    2. Leg swelling  Sent to the lab.  - BTYPE NATRIURETIC PEPTIDE; Future    3. Dysuria  Looking at sensitivities the only other oral agent that would be acceptable B Levaquin. Have empirically given a 250 mg dose for 10 days but awaiting C kidney function. Have given strong ER precautions to wife per parameters of going. If blood work does not look well. The hospital also.  - URINALYSIS,CULTURE IF INDICATED; Future  - COMP METABOLIC PANEL; Future  - CBC WITH DIFFERENTIAL; Future

## 2017-06-20 NOTE — TELEPHONE ENCOUNTER
Future Appointments       Provider Department Center    6/20/2017 10:00 AM PULMONARY REHAB PM CLASS PULMONARY LAB [facility]     6/22/2017 10:00 AM PULMONARY REHAB PM CLASS PULMONARY LAB OP [facility]     6/27/2017 10:00 AM PULMONARY REHAB PM CLASS PULMONARY LAB OP [facility]     6/28/2017 1:40 PM Ric Gil M.D. [facility] [address]    6/29/2017 10:00 AM PULMONARY REHAB PM CLASS PULMONARY LAB OP [facility]     7/4/2017 10:00 AM PULMONARY REHAB PM CLASS PULMONARY LAB OP [facility]     7/6/2017 10:00 AM PULMONARY REHAB PM CLASS PULMONARY LAB OP [facility]     7/11/2017 10:00 AM PULMONARY REHAB PM CLASS PULMONARY LAB OP [facility]     7/13/2017 10:00 AM PULMONARY REHAB PM CLASS PULMONARY LAB OP [facility]     7/18/2017 10:00 AM PULMONARY REHAB PM CLASS PULMONARY LAB OP [facility]     7/20/2017 10:00 AM PULMONARY REHAB PM CLASS PULMONARY LAB OP [facility]     7/27/2017 4:20 PM MANA Arredondo. [facility] Pulmonary [facility]     12/11/2017 11:00 AM Vincent Dumont [facility]. [facility]         ESTABLISHED PATIENT PRE-VISIT PLANNING     Note: Patient will not be contacted if there is no indication to call.     1.  Reviewed note from last office visit with PCP and/or other med group provider: Yes    2.  If any orders were placed at last visit, do we have Results/Consult Notes?        •  Labs - Labs ordered, completed and results are in chart.       •  Imaging - Imaging was not ordered at last office visit.       •  Referrals - Referral ordered, patient has NOT been seen.    3.  Immunizations were updated in [facility] using WebIZ?: Yes       •  Web Iz Recommendations: HEPATITIS A  HEPATITIS B MMR  ZOSTAVAX (Shingles)    4.  Patient is due for the following Health Maintenance Topics:   There are no preventive care reminders to display for this patient.        5.  Patient was not informed to arrive 15 min prior to their scheduled appointment and bring in their medication bottles.

## 2017-06-21 NOTE — MR AVS SNAPSHOT
Meng Snyder   2017 2:00 PM   Non-Provider Visit   MRN: [id number]    Department:  [address] [place] [address]   Dept Phone:  [phone]    Description:  Male : 1935   Provider:  MEDICAL ASSISTANT           Reason for Visit     UTI Rocephin Injection      Allergies as of 2017     No Known Allergies      You were diagnosed with     Recurrent UTI   [812320]       Urinary tract infection without hematuria, site unspecified   [[id number]]         Vital Signs     Smoking Status                   Former Smoker           Basic Information     Date Of Birth Sex Race Ethnicity Preferred Language    1935 Male White Non- English      Your appointments     2017 10:00 AM   Pulmonary Rehab Class with PULMONARY REHAB PM CLASS   PULMONARY LAB OP [facility] (--)    [address] [facility] [address] [phone]            2017 11:00 AM   Non Provider 1 with CISCO MILLER   [facility] [address]    [address]  [facility] [address]  [facility] [id number] [phone]           You will be receiving a confirmation call a few days before your appointment from our automated call confirmation system.            2017 10:00 AM   Pulmonary Rehab Class with PULMONARY REHAB PM CLASS   PULMONARY LAB OP [facility] (--)    [address] [facility] [place] [address] [phone]            2017  1:40 PM   Established Patient with Ric Gil M.D.   [facility] [address]    [address]  [address]  [facility] [id number] [phone]           You will be receiving a confirmation call a few days before your appointment from our automated call confirmation system.            2017 10:00 AM   Pulmonary Rehab Class with PULMONARY REHAB PM CLASS   PULMONARY LAB OP [facility] (--)    [address] [facility] [place] [address] [phone]            2017 10:00 AM   Pulmonary Rehab Class with PULMONARY REHAB PM CLASS   PULMONARY LAB OP [facility] (--)    [address] [facility]  [facility] [address]   [phone]            Jul 06, 2017 10:00 AM   Pulmonary Rehab Class with PULMONARY REHAB PM CLASS   PULMONARY LAB OP [facility] (--)    [address] [facility] [address]   [phone]            Jul 11, 2017 10:00 AM   Pulmonary Rehab Class with PULMONARY REHAB PM CLASS   PULMONARY LAB OP [facility] (--)    [address] [facility] [address]   [phone]            Jul 13, 2017 10:00 AM   Pulmonary Rehab Class with PULMONARY REHAB PM CLASS   PULMONARY LAB OP [facility] (--)    [address] [facility] [address]   [phone]            Jul 18, 2017 10:00 AM   Pulmonary Rehab Class with PULMONARY REHAB PM CLASS   PULMONARY LAB OP [facility] (--)    [address] [facility] [address]   [phone]            Jul 20, 2017 10:00 AM   Pulmonary Rehab Class with PULMONARY REHAB PM CLASS   PULMONARY LAB OP [facility] (--)    [address] [place] [address]   [phone]            Jul 27, 2017  4:20 PM   Established Patient Pul with MARGARITA Arredondo   [facility] Pulmonary Medicine (--)    [address]  [address]  [facility] [address]   [phone]            Dec 11, 2017 11:00 AM   FOLLOW UP with Vincent Dumont M.D.   [facility]    [address]  [address]  [facility] [address] [phone]              Problem List              ICD-10-CM Priority Class Noted - Resolved    HTN (hypertension), benign (Chronic) I10 Low  5/8/2012 - Present    Family history of colon cancer Z80.0   5/8/2012 - Present    Rheumatoid arthritis (CMS-HCC) (Chronic) M06.9 Low  5/8/2012 - Present    BPH (benign prostatic hyperplasia) (Chronic) N40.0 Low  6/3/2013 - Present    COPD (chronic obstructive pulmonary disease) (CMS-HCC) J44.9 Low  12/28/2014 - Present    Other chronic pulmonary heart diseases I27.89 High  5/7/2015 - Present    Chronic respiratory failure (CMS-HCC) J96.10 Low  5/7/2015 - Present    Tremor R25.1   6/26/2015 - Present     Headache R51   8/22/2016 - Present    Mass of lower lobe of left lung R91.8   8/22/2016 - Present    CVA (cerebral vascular accident) (CMS-HCC) I63.9 High  8/23/2016 - Present    Atrial fibrillation (CMS-HCC) I48.91 Medium  8/23/2016 - Present    CKD (chronic kidney disease), 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 Low  8/23/2016 - Present    Dyslipidemia E78.5 Low  8/23/2016 - Present    Late effect of stroke I69.30   9/8/2016 - Present      Health Maintenance        Date Due Completion Dates    IMM ZOSTER VACCINE 6/14/2023 (Originally 9/17/1997) ---    IMM DTaP/Tdap/Td Vaccine (2 - Td) 5/8/2022 5/8/2012            Current Immunizations     13-VALENT PCV PREVNAR 9/19/2016 11:18 AM    Hepatitis B Vaccine Non-Recombivax (Ped/Adol) 11/19/2012    Influenza TIV (IM) 10/1/2016, 10/1/2015, 9/26/2014    Pneumococcal polysaccharide vaccine (PPSV-23) 12/1/2014    Tdap Vaccine 5/8/2012      Below and/or attached are the medications your provider expects you to take. Review all of your home medications and newly ordered medications with your provider and/or pharmacist. Follow medication instructions as directed by your provider and/or pharmacist. Please keep your medication list with you and share with your provider. Update the information when medications are discontinued, doses are changed, or new medications (including over-the-counter products) are added; and carry medication information at all times in the event of emergency situations     Allergies:  No Known Allergies          Medications  Valid as of: June 21, 2017 -  2:05 PM    Generic Name Brand Name Tablet Size Instructions for use    Albuterol Sulfate (Aero Soln) albuterol 108 (90 BASE) MCG/ACT Inhale 2 Puffs by mouth every 6 hours as needed. Indications: Asthma        Albuterol Sulfate (Nebu Soln) PROVENTIL 2.5mg/0.5ml 0.5 mL by Nebulization route 2 Times a Day.        Apixaban (Tab) ELIQUIS 5mg Take 1 Tab by mouth 2 Times a Day.        Atorvastatin Calcium (Tab) LIPITOR 40 MG Take 1 Tab  by mouth every bedtime.        Cholecalciferol (Tab) cholecalciferol 1000 UNIT Take 2,000 Units by mouth every evening.        Dutasteride (Cap) AVODART 0.5 MG Take 1 Cap by mouth every evening.        Fluticasone-Salmeterol (AEROSOL POWDER, BREATH ACTIVATED) ADVAIR 500-50 MCG/DOSE Inhale 1 Puff by mouth every 12 hours.        Folic Acid (Tab) FOLVITE 1 MG Take 2 Tabs by mouth every evening.        LevoFLOXacin (Tab) LEVAQUIN 250 MG Take 1 Tab by mouth every day.        Methotrexate Sodium (Tab) methotrexate 2.5 MG Take 2 Tabs by mouth every 7 days. Unknown what day he takes this.        MethylPREDNISolone (Tab) MEDROL 4 MG Take 1 Tab by mouth every day.        Multiple Vitamins-Minerals   Take 1 Tab by mouth every evening.        PredniSONE (Tab) DELTASONE 5 MG Take 1 Tab by mouth every day.        PredniSONE (Tab) DELTASONE 10 MG Take 30mg x 3 days, then take 20mg x 3 days, then take 10mg x 3 days, with food and as directed        Terazosin HCl (Cap) HYTRIN 5 MG Take 1 Cap by mouth every day.        Tiotropium Bromide Monohydrate (Cap) SPIRIVA 18 MCG Inhale 1 Cap by mouth every day.        Triamterene-HCTZ (Tab) MAXZIDE-25/DYAZIDE 37.5-25 MG Take 1 Tab by mouth every day.        .                 Medicines prescribed today were sent to:     [id number] - [place], [place] - [facility] access hospital[address] [address]    Phone: [phone] Fax: [phone]    Open 24 Hours?: No    [facility]    [address] [facility] [address] [facility] [address]    Phone: [phone] Fax: [phone]      Medication refill instructions:       If your prescription bottle indicates you have medication refills left, it is not necessary to call your provider’s office. Please contact your pharmacy and they will refill your medication.    If your prescription bottle indicates you do not have any refills left, you may request refills at any time through one of the following ways: The online Port[employer] system (except Urgent  Care), by calling your provider’s office, or by asking your pharmacy to contact your provider’s office with a refill request. Medication refills are processed only during regular business hours and may not be available until the next business day. Your provider may request additional information or to have a follow-up visit with you prior to refilling your medication.   *Please Note: Medication refills are assigned a new Rx number when refilled electronically. Your pharmacy may indicate that no refills were authorized even though a new prescription for the same medication is available at the pharmacy. Please request the medicine by name with the pharmacy before contacting your provider for a refill.        Your To Do List     Future Labs/Procedures Complete By Expires    CT-ABDOMEN W/O  As directed 6/21/2018      Other Notes About Your Plan     Patient Enrolled in Medical Home with Dr. Gil.   Please assess and status chronic disease from prior visits.    No queries.           Phase III Development Access Code: Activation code not generated  Current Phase III Development Status: Active

## 2017-06-21 NOTE — PROGRESS NOTES
Meng Snyder is a 81 y.o. male here for a non-provider visit for Rocephin injection.    Reason for injection: UTI  Order in MAR?: Yes  Patient supplied?:No  Minimum interval has been met for this injection (per MAR order): Yes    Order and dose verified by: Magi Johnson  Patient tolerated injection and no adverse effects were observed or reported: Yes    # of Administrations remaining in MAR: 1

## 2017-06-26 NOTE — PROGRESS NOTES
Meng Snyder is a 81 y.o. male here for a non-provider visit for Rocephin injection.    Reason for injection: UTi  Order in MAR?: Yes  Patient supplied?:No  Minimum interval has been met for this injection (per MAR order): Yes    Order and dose verified by: Rafael Johnson  Patient tolerated injection and no adverse effects were observed or reported: Yes    # of Administrations remaining in MAR: 0

## 2017-06-26 NOTE — MR AVS SNAPSHOT
Meng Snyder   2017 11:00 AM   Non-Provider Visit   MRN: [id number]    Department:  [address] [place] [address]   Dept Phone:  [phone]    Description:  Male : 1935   Provider:  MEDICAL ASSISTANT           Reason for Visit     UTI Rocephin Shot      Allergies as of 2017     No Known Allergies      You were diagnosed with     Urinary tract infection without hematuria, site unspecified   [[id number]]         Vital Signs     Smoking Status                   Former Smoker           Basic Information     Date Of Birth Sex Race Ethnicity Preferred Language    1935 Male White Non- English      Your appointments     2017  4:30 PM   CT BODY WO 30 with VISTA CT 1   IMAGING VISTA ([place])    [address] [facility] [address] [phone]           Some exams require specific prep instructions that would have been given to you at time of scheduling. If you have any additional questions about the prep instructions, please call Imaging Scheduling at [phone] and press #2.            2017 10:00 AM   Pulmonary Rehab Class with PULMONARY REHAB PM CLASS   PULMONARY LAB OP [facility] (--)    [address] [facility] [address]   [phone]            2017  1:40 PM   Established Patient with Ric Gil M.D.   [facility] [address]    [address] [facility] [address]  [facility] [address] [phone]           You will be receiving a confirmation call a few days before your appointment from our automated call confirmation system.            2017 10:00 AM   Pulmonary Rehab Class with PULMONARY REHAB PM CLASS   PULMONARY LAB OP [facility] (--)    [address] [facility] [address]   [phone]            2017 10:00 AM   Pulmonary Rehab Class with PULMONARY REHAB PM CLASS   PULMONARY LAB OP [facility] (--)    [address] [facility] [address]   [phone]            2017 10:00 AM   Pulmonary Rehab Class with PULMONARY REHAB PM CLASS      [facility]UL[facility] LAB OP [facility] (--)    [address] [facility] [place] [address]   [phone]            Jul 11, 2017 10:00 AM   Pulmonary Rehab Class with PULMONARY REHAB PM CLASS   PULMONARY LAB [facility] (--)    [address] [facility] [place] [address]   [phone]            Jul 13, 2017 10:00 AM   Pulmonary Rehab Class with PULMONARY REHAB PM CLASS   [facility] LAB [facility] (--)    [address] [facility] [place] [address]   [phone]            Jul 18, 2017 10:00 AM   Pulmonary Rehab Class with PULMONARY REHAB PM CLASS   [facility]UL[facility] LAB [facility] (--)    [address] [facility] [place] [address]   [phone]            Jul 20, 2017 10:00 AM   Pulmonary Rehab Class with PULMONARY REHAB PM CLASS   PULMONARY LAB OP [facility] (--)    [address] [facility] [place] [address]   [phone]            Jul 27, 2017  4:20 PM   Established Patient Pul with MARGARITA Arredondo   [facility] Medicine (--)    [address]  [address]  [facility] [address]   [phone]            Dec 11, 2017 11:00 AM   FOLLOW UP with Vincent Dumont M.D.   [facility]    [address]  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 High  5/7/2015 - Present    Chronic respiratory failure (CMS-HCC) J96.10 Low  5/7/2015 - Present    Tremor R25.1   6/26/2015 - Present    Headache R51   8/22/2016 - Present    Mass of lower lobe of left lung R91.8   8/22/2016 - Present    CVA (cerebral vascular  accident) (CMS-HCC) I63.9 High  8/23/2016 - Present    Atrial fibrillation (CMS-HCC) I48.91 Medium  8/23/2016 - Present    CKD (chronic kidney disease), 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 Low  8/23/2016 - Present    Dyslipidemia E78.5 Low  8/23/2016 - Present    Late effect of stroke I69.30   9/8/2016 - Present      Health Maintenance        Date Due Completion Dates    IMM ZOSTER VACCINE 6/14/2023 (Originally 9/17/1997) ---    IMM DTaP/Tdap/Td Vaccine (2 - Td) 5/8/2022 5/8/2012            Current Immunizations     13-VALENT PCV PREVNAR 9/19/2016 11:18 AM    Hepatitis B Vaccine Non-Recombivax (Ped/Adol) 11/19/2012    Influenza TIV (IM) 10/1/2016, 10/1/2015, 9/26/2014    Pneumococcal polysaccharide vaccine (PPSV-23) 12/1/2014    Tdap Vaccine 5/8/2012      Below and/or attached are the medications your provider expects you to take. Review all of your home medications and newly ordered medications with your provider and/or pharmacist. Follow medication instructions as directed by your provider and/or pharmacist. Please keep your medication list with you and share with your provider. Update the information when medications are discontinued, doses are changed, or new medications (including over-the-counter products) are added; and carry medication information at all times in the event of emergency situations     Allergies:  No Known Allergies          Medications  Valid as of: June 26, 2017 - 11:14 AM    Generic Name Brand Name Tablet Size Instructions for use    Albuterol Sulfate (Aero Soln) albuterol 108 (90 BASE) MCG/ACT Inhale 2 Puffs by mouth every 6 hours as needed. Indications: Asthma        Albuterol Sulfate (Nebu Soln) PROVENTIL 2.5mg/0.5ml 0.5 mL by Nebulization route 2 Times a Day.        Apixaban (Tab) ELIQUIS 5mg Take 1 Tab by mouth 2 Times a Day.        Atorvastatin Calcium (Tab) LIPITOR 40 MG Take 1 Tab by mouth every bedtime.        Cholecalciferol (Tab) cholecalciferol 1000 UNIT Take 2,000 Units by mouth every evening.          Dutasteride (Cap) AVODART 0.5 MG Take 1 Cap by mouth every evening.        Fluticasone-Salmeterol (AEROSOL POWDER, BREATH ACTIVATED) ADVAIR 500-50 MCG/DOSE Inhale 1 Puff by mouth every 12 hours.        Folic Acid (Tab) FOLVITE 1 MG Take 2 Tabs by mouth every evening.        LevoFLOXacin (Tab) LEVAQUIN 250 MG Take 1 Tab by mouth every day.        Methotrexate Sodium (Tab) methotrexate 2.5 MG Take 2 Tabs by mouth every 7 days. Unknown what day he takes this.        MethylPREDNISolone (Tab) MEDROL 4 MG Take 1 Tab by mouth every day.        Multiple Vitamins-Minerals   Take 1 Tab by mouth every evening.        PredniSONE (Tab) DELTASONE 5 MG Take 1 Tab by mouth every day.        PredniSONE (Tab) DELTASONE 10 MG Take 30mg x 3 days, then take 20mg x 3 days, then take 10mg x 3 days, with food and as directed        Terazosin HCl (Cap) HYTRIN 5 MG Take 1 Cap by mouth every day.        Tiotropium Bromide Monohydrate (Cap) SPIRIVA 18 MCG Inhale 1 Cap by mouth every day.        Triamterene-HCTZ (Tab) MAXZIDE-25/DYAZIDE 37.5-25 MG Take 1 Tab by mouth every day.        .                 Medicines prescribed today were sent to:     [facility] - [place], [place] - [address] [facility]    [address] [facility] [address]    Phone: [phone] Fax: [phone]    Open 24 Hours?: No    DME [facility]    [address] [facility] [address] [facility] [address]    Phone: [phone] Fax: [phone]      Medication refill instructions:       If your prescription bottle indicates you have medication refills left, it is not necessary to call your provider’s office. Please contact your pharmacy and they will refill your medication.    If your prescription bottle indicates you do not have any refills left, you may request refills at any time through one of the following ways: The online [employer] system (except Urgent Care), by calling your provider’s office, or by asking your pharmacy to contact your provider’s office with a refill  request. Medication refills are processed only during regular business hours and may not be available until the next business day. Your provider may request additional information or to have a follow-up visit with you prior to refilling your medication.   *Please Note: Medication refills are assigned a new Rx number when refilled electronically. Your pharmacy may indicate that no refills were authorized even though a new prescription for the same medication is available at the pharmacy. Please request the medicine by name with the pharmacy before contacting your provider for a refill.        Other Notes About Your Plan     Patient Enrolled in Medical Home with Dr. Gil.   Please assess and status chronic disease from prior visits.    No queries.           [employer] Access Code: Activation code not generated  Current [employer] Status: Active

## 2017-06-28 NOTE — MR AVS SNAPSHOT
"        Meng Snyder   2017 1:40 PM   Office Visit   MRN: [id number]    Department:  [address] [place] [address]   Dept Phone:  [phone]    Description:  Male : 1935   Provider:  Ric Gil M.D.           Reason for Visit     Follow-Up           Allergies as of 2017     No Known Allergies      You were diagnosed with     Acute cystitis without hematuria   [[id number]]         Vital Signs     Blood Pressure Pulse Temperature Respirations Height Weight    112/74 mmHg 87 36.8 °C (98.2 °F) 16 1.727 m (5' 7.99\") 69.151 kg (152 lb 7.2 oz)    Body Mass Index Oxygen Saturation Smoking Status             23.19 kg/m2 93% Former Smoker         Basic Information     Date Of Birth Sex Race Ethnicity Preferred Language    1935 Male White Non- English      Your appointments     2017 10:00 AM   Pulmonary Rehab Class with PULMONARY REHAB PM CLASS   [facility]ULMON[facility] LAB [facility] (--)    [address] [facility] [address]   [phone]            2017 10:00 AM   Pulmonary Rehab Class with PULMONARY REHAB PM CLASS   PULMONARY LAB [facility] (--)    [address] [facility] [address]   [phone]            2017 10:00 AM   Pulmonary Rehab Class with PULMONARY REHAB PM CLASS   PULMONARY LAB [facility] (--)    [address] [facility] [address]   [phone]            2017 10:00 AM   Pulmonary Rehab Class with PULMONARY REHAB PM CLASS   [facility]UL[facility]ARY LAB [facility] (--)    [address] [facility] [address]   [phone]            2017 10:00 AM   Pulmonary Rehab Class with PULMONARY REHAB PM CLASS   PULMONARY LAB [facility] (--)    [address] [facility] [address]   [phone]            2017 10:00 AM   Pulmonary Rehab Class with PULMONARY REHAB PM CLASS   PULMONARY LAB [facility] (--)    [address] [facility] [address]   [phone]            2017 10:00 AM   Pulmonary Rehab Class with PULMONARY REHAB PM CLASS   [facility]UL[facility]ARY LAB OP [facility] (--)    [address] [facility]  [place] [address]   [phone]            Jul 27, 2017  4:20 PM   Established Patient Pul with MARGARITA Arredondo   [facility] Pul[facility] (--)    [address]  [address]  [place] [address] [phone]            Dec 11, 2017 11:00 AM   FOLLOW UP with Vincent Dumont M.D.   [facility] (--)    [address]  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 High  5/7/2015 - Present    Chronic respiratory failure (CMS-HCC) J96.10 Low  5/7/2015 - Present    Tremor R25.1   6/26/2015 - Present    Headache R51   8/22/2016 - Present    Mass of lower lobe of left lung R91.8   8/22/2016 - Present    CVA (cerebral vascular accident) (CMS-HCC) I63.9 High  8/23/2016 - Present    Atrial fibrillation (CMS-HCC) I48.91 Medium  8/23/2016 - Present    CKD (chronic kidney disease), 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 Low  8/23/2016 - Present    Dyslipidemia E78.5 Low  8/23/2016 - Present    Late effect of stroke I69.30   9/8/2016 - Present      Health Maintenance        Date Due Completion Dates    IMM ZOSTER VACCINE 6/14/2023 (Originally 9/17/1997) ---    IMM DTaP/Tdap/Td Vaccine (2 - Td) 5/8/2022 5/8/2012            Current Immunizations     13-VALENT PCV PREVNAR 9/19/2016 11:18 AM    Hepatitis B Vaccine Non-Recombivax (Ped/Adol) 11/19/2012    Influenza TIV (IM) 10/1/2016, 10/1/2015, 9/26/2014    Pneumococcal polysaccharide vaccine (PPSV-23) 12/1/2014    Tdap  Vaccine 5/8/2012      Below and/or attached are the medications your provider expects you to take. Review all of your home medications and newly ordered medications with your provider and/or pharmacist. Follow medication instructions as directed by your provider and/or pharmacist. Please keep your medication list with you and share with your provider. Update the information when medications are discontinued, doses are changed, or new medications (including over-the-counter products) are added; and carry medication information at all times in the event of emergency situations     Allergies:  No Known Allergies          Medications  Valid as of: June 28, 2017 -  2:11 PM    Generic Name Brand Name Tablet Size Instructions for use    Albuterol Sulfate (Aero Soln) albuterol 108 (90 BASE) MCG/ACT Inhale 2 Puffs by mouth every 6 hours as needed. Indications: Asthma        Albuterol Sulfate (Nebu Soln) PROVENTIL 2.5mg/0.5ml 0.5 mL by Nebulization route 2 Times a Day.        Apixaban (Tab) ELIQUIS 5mg Take 1 Tab by mouth 2 Times a Day.        Atorvastatin Calcium (Tab) LIPITOR 40 MG Take 1 Tab by mouth every bedtime.        Cefdinir (Cap) OMNICEF 300 MG Take 1 Cap by mouth 2 times a day for 14 days.        Cholecalciferol (Tab) cholecalciferol 1000 UNIT Take 2,000 Units by mouth every evening.        Dutasteride (Cap) AVODART 0.5 MG Take 1 Cap by mouth every evening.        Fluticasone-Salmeterol (AEROSOL POWDER, BREATH ACTIVATED) ADVAIR 500-50 MCG/DOSE Inhale 1 Puff by mouth every 12 hours.        Folic Acid (Tab) FOLVITE 1 MG Take 2 Tabs by mouth every evening.        LevoFLOXacin (Tab) LEVAQUIN 250 MG Take 1 Tab by mouth every day.        Methotrexate Sodium (Tab) methotrexate 2.5 MG Take 2 Tabs by mouth every 7 days. Unknown what day he takes this.        MethylPREDNISolone (Tab) MEDROL 4 MG Take 1 Tab by mouth every day.        Multiple Vitamins-Minerals   Take 1 Tab by mouth every evening.        Phenazopyridine HCl  (Tab) PYRIDIUM 200 MG Take 1 Tab by mouth 2 Times a Day.        PredniSONE (Tab) DELTASONE 5 MG Take 1 Tab by mouth every day.        PredniSONE (Tab) DELTASONE 10 MG Take 30mg x 3 days, then take 20mg x 3 days, then take 10mg x 3 days, with food and as directed        Terazosin HCl (Cap) HYTRIN 5 MG Take 1 Cap by mouth every day.        Tiotropium Bromide Monohydrate (Cap) SPIRIVA 18 MCG Inhale 1 Cap by mouth every day.        Triamterene-HCTZ (Tab) MAXZIDE-25/DYAZIDE 37.5-25 MG Take 1 Tab by mouth every day.        .                 Medicines prescribed today were sent to:     [facility] - [place], [place] - [address]    [address] [facility] [place] [address]    Phone: [phone] Fax: [phone]    Open 24 Hours?: No    DME [facility]    [address] [facility] [address] [place] [address]    Phone: [phone] Fax: [phone]      Medication refill instructions:       If your prescription bottle indicates you have medication refills left, it is not necessary to call your provider’s office. Please contact your pharmacy and they will refill your medication.    If your prescription bottle indicates you do not have any refills left, you may request refills at any time through one of the following ways: The online [employer] system (except Urgent Care), by calling your provider’s office, or by asking your pharmacy to contact your provider’s office with a refill request. Medication refills are processed only during regular business hours and may not be available until the next business day. Your provider may request additional information or to have a follow-up visit with you prior to refilling your medication.   *Please Note: Medication refills are assigned a new Rx number when refilled electronically. Your pharmacy may indicate that no refills were authorized even though a new prescription for the same medication is available at the pharmacy. Please request the medicine by name with the pharmacy before  contacting your provider for a refill.        Other Notes About Your Plan     Patient Enrolled in Medical Home with Dr. Gil.   Please assess and status chronic disease from prior visits.    No queries.           [employer] Access Code: Activation code not generated  Current [employer] Status: Active

## 2017-06-28 NOTE — PROGRESS NOTES
CC: Follow-up persistent UTI.    HPI:   Meng presents today with the following.    1. Acute cystitis without hematuria  Presents after starting on Rocephin injections. Reports his symptoms are improving slightly. 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 He is still having some symptomatology of pain with urination as well as frequency. He denies any fevers or chills no flank pain. He does have an appointment with urology but not for several more weeks.  - CefTRIAXone Sodium      Patient Active Problem List    Diagnosis Date Noted   • CVA (cerebral vascular accident) (CMS-HCC) 08/23/2016     Priority: High   • Other chronic pulmonary heart diseases 05/07/2015     Priority: High   • Atrial fibrillation (CMS-HCC) 08/23/2016     Priority: Medium   • CKD (chronic kidney disease), stage II 08/23/2016     Priority: Low   • Dyslipidemia 08/23/2016     Priority: Low   • Chronic respiratory failure (CMS-HCC) 05/07/2015     Priority: Low   • COPD (chronic obstructive pulmonary disease) (CMS-HCC) 12/28/2014     Priority: Low   • BPH (benign prostatic hyperplasia) 06/03/2013     Priority: Low   • HTN (hypertension), benign 05/08/2012     Priority: Low   • Rheumatoid arthritis (CMS-HCC) 05/08/2012     Priority: Low   • Late effect of stroke 09/08/2016   • Headache 08/22/2016   • Mass of lower lobe of left lung 08/22/2016   • Tremor 06/26/2015   • Family history of colon cancer 05/08/2012       Current Outpatient Prescriptions   Medication Sig Dispense Refill   • cefdinir (OMNICEF) 300 MG Cap Take 1 Cap by mouth 2 times a day for 14 days. 28 Cap 0   • phenazopyridine (PYRIDIUM) 200 MG Tab Take 1 Tab by mouth 2 Times a Day. 60 Tab 0   • predniSONE (DELTASONE) 10 MG Tab Take 30mg x 3 days, then take 20mg x 3 days, then take 10mg x 3 days, with food and as directed 30 Tab 2   • folic acid (FOLVITE) 1 MG Tab Take 2 Tabs by mouth every evening. 180 Tab 3   • triamterene-hctz (MAXZIDE-25/DYAZIDE) 37.5-25 MG Tab Take 1  "Tab by mouth every day. 30 Tab 11   • apixaban (ELIQUIS) 5mg Tab Take 1 Tab by mouth 2 Times a Day. 180 Tab 1   • predniSONE (DELTASONE) 5 MG Tab Take 1 Tab by mouth every day. 90 Tab 3   • fluticasone-salmeterol (ADVAIR DISKUS) 500-50 MCG/DOSE AEROSOL POWDER, BREATH ACTIVATED Inhale 1 Puff by mouth every 12 hours. 3 Inhaler 3   • methotrexate 2.5 MG Tab Take 2 Tabs by mouth every 7 days. Unknown what day he takes this. 10 Tab 6   • albuterol (PROVENTIL) 2.5mg/0.5ml Nebu Soln 0.5 mL by Nebulization route 2 Times a Day. 375 mL 5   • dutasteride (AVODART) 0.5 MG capsule Take 1 Cap by mouth every evening. 90 Cap 3   • terazosin (HYTRIN) 5 MG Cap Take 1 Cap by mouth every day. 30 Cap 11   • tiotropium (SPIRIVA) 18 MCG Cap Inhale 1 Cap by mouth every day. 90 Cap 3   • albuterol 108 (90 BASE) MCG/ACT Aero Soln inhalation aerosol Inhale 2 Puffs by mouth every 6 hours as needed. Indications: Asthma 8.5 g 5   • Multiple Vitamins-Minerals (MULTIVITAMIN PO) Take 1 Tab by mouth every evening.     • vitamin D (CHOLECALCIFEROL) 1000 UNIT TABS Take 2,000 Units by mouth every evening.     • levofloxacin (LEVAQUIN) 250 MG Tab Take 1 Tab by mouth every day. (Patient not taking: Reported on 6/28/2017) 10 Tab 0   • methylPREDNISolone (MEDROL) 4 MG Tab Take 1 Tab by mouth every day. (Patient not taking: Reported on 6/28/2017) 90 Tab 3   • atorvastatin (LIPITOR) 40 MG Tab Take 1 Tab by mouth every bedtime. (Patient not taking: Reported on 12/20/2016) 30 Tab 6     Current Facility-Administered Medications   Medication Dose Route Frequency Provider Last Rate Last Dose   • cefTRIAXone (ROCEPHIN) injection 1 g  1 g Intramuscular Once Ric Gil M.D.             Allergies as of 06/28/2017   • (No Known Allergies)        ROS: As per HPI.    /74 mmHg  Pulse 87  Temp(Src) 36.8 °C (98.2 °F)  Resp 16  Ht 1.727 m (5' 7.99\")  Wt 69.151 kg (152 lb 7.2 oz)  BMI 23.19 kg/m2  SpO2 93%    Physical Exam:  Gen:         Alert and oriented, " No apparent distress.  Neck:        No Lymphadenopathy or Bruits.  Lungs:     Clear to auscultation bilaterally  CV:          Regular rate and rhythm. No murmurs, rubs or gallops.               Ext:          No clubbing, cyanosis, edema.      Assessment and Plan.   81 y.o. male with the following issues.    1. Acute cystitis without hematuria  We'll give one more shot of Rocephin today and switching to Omnicef 2 times a day for the next 14 days and he will get into urology.  - cefdinir (OMNICEF) 300 MG Cap; Take 1 Cap by mouth 2 times a day for 14 days.  Dispense: 28 Cap; Refill: 0  - cefTRIAXone (ROCEPHIN) injection 1 g; 1,000 mg by Intramuscular route Once.

## 2017-07-27 NOTE — PROGRESS NOTES
Chief Complaint   Patient presents with   • COPD       HPI:  Meng Snyder is a 81 y.o. year old male here today for follow-up on his severe COPD, chronic respiratory failure and lung mass. PFT's February 2017 indicated an FEV1 of 1.08 L, 48% predicted with an FEV1/FVC ratio of 51 with a DLCO of 38% predicted. Prior PFT's 2014 indicated an FEV1 of 45% predicted with a diffusion capacity test of 64% predicted. He also has a large left lung mass over 8 cm. It has been observed since 2005 and is very slowly increasing in size. He said several biopsies which have been nondiagnostic. He is currently on chronic anticoagulation for atrial fibrillation. He had increased lower extremity swelling. He has since been seen by his Cardiologist, Dr. Dumont. He was taking off his Amlodipine and resumed on his Dyazide. He was seen 6/15/2017 with complaints of increased swelling worse of the prior week. He was encouraged to take Lasix for 3 days. He has since seen his PCP since. He was treated for cystitis.   He is on oxygen 24 hours a day and he uses Advair and Spiriva inhalers. He does have a home nebulizer with Albuterol. He uses this 3 times a day. He also has an Albuterol HFA inhaler on hand. He is on methotrexate for rheumatoid arthritis. He quit smoking back in 2004. He was referred to Pulmonary rehab at his last office visit. He is attending pulmonary rehab currently. Chest xray at his last office visit indicated;   1.  Left lung mass again identified which appears slightly smaller than on the prior exam.  2.  Probable small amount of pleural fluid versus pleural thickening noted in the left major fissure.  3.  Minimal blunting of left costophrenic angle is again noted.  4.  No new infiltrates or consolidations are identified.    He took a Medrol dosepack last month for c/o increased dyspnea. He is feeling much better. His shortness of breath has improved. He denies current cough, mucous or wheeze. He denies fevers or  "chills. He states he was treated for the cystitis. However, his swelling has not improved. He is not on a diuretic currently.         Past Medical History   Diagnosis Date   • EMPHYSEMA 5/8/2012   • MEDICAL HOME 10/24/12   • BPH (benign prostatic hyperplasia) 6/3/2013   • Hiatus hernia syndrome    • Unspecified cataract      bilateral IOL   • Hepatitis A      pt thinks  it was A from  \"food \"   • Dental disorder      upper/lower   • Breath shortness      1-3 l/m 24/7   • Anemia    • RA    • Rheumatoid arthritis, adult (CMS-HCC)    • Anesthesia      pt said \"can't be on vent due to coughing after\"   • Hemorrhagic disorder (CMS-HCC) 10/12/15     on blood thinner   • Arrhythmia      hx a fib       Past Surgical History   Procedure Laterality Date   • Hip hemiarthroplasty  12/28/2014     Performed by Vincent Krishna M.D. at SURGERY [facility] ORS   • Other abdominal surgery  2004      colon resection colostomy with colostomy take down   • Recovery  2/13/2015     Performed by -Recovery Surgery at SURGERY SAME DAY [facility] ORS   • Bronchoscopy-endo N/A 10/13/2015     Procedure: BRONCHOSCOPY-ENDO;  Surgeon: James WHITLEY M.D.;  Location: [facility];  Service:    • Bronchoscopy-endo N/A 3/3/2016     Procedure: FIBEROPTIC BRONCHOSCOPY-ENDO W/BRONCHOALVEOLAR LAVAGE;  Surgeon: Ric Dailey M.D.;  Location: [facility];  Service:        Family History   Problem Relation Age of Onset   • Hypertension Father    • Stroke         Social History     Social History   • Marital Status:      Spouse Name: N/A   • Number of Children: N/A   • Years of Education: N/A     Occupational History   • Not on file.     Social History Main Topics   • Smoking status: Former Smoker -- 2.00 packs/day for 50 years     Types: Cigarettes     Quit date: 01/01/2004   • Smokeless tobacco: Former User     Types: Chew     Quit date: 03/14/2002   • Alcohol Use: No   • Drug Use: No   • Sexual Activity:     " Partners: Female     Other Topics Concern   • Not on file     Social History Narrative       ROS:  Constitutional: Denies fevers, chills, sweats, fatigue, weight loss  Eyes: Denies glasses, vision loss, pain, drainage, double vision  Ears/Nose/Mouth/Throat: Denies rhinitis, nasal congestion, ear ache, difficulty hearing, sore throat, persistent hoarseness, decayed teeth/toothache  Cardiovascular: Denies chest pain, tightness, palpitations, fainting, difficulty breathing when laying down. Positive lower extremity edema   Respiratory: See HPI   GI: Denies heartburn, difficulty swallowing, nausea, vomiting, abdominal pain, diarrhea, constipation  : Positive for dysuria  Integumentary: Denies rashes, lumps or color changes  MSK: Denies painful joints, sore muscles, and back pain.   Neurological: Denies frequent headaches, dizziness, weakness      Current Outpatient Prescriptions on File Prior to Visit   Medication Sig Dispense Refill   • phenazopyridine (PYRIDIUM) 200 MG Tab Take 1 Tab by mouth 2 Times a Day. 60 Tab 0   • levofloxacin (LEVAQUIN) 250 MG Tab Take 1 Tab by mouth every day. 10 Tab 0   • predniSONE (DELTASONE) 10 MG Tab Take 30mg x 3 days, then take 20mg x 3 days, then take 10mg x 3 days, with food and as directed 30 Tab 2   • folic acid (FOLVITE) 1 MG Tab Take 2 Tabs by mouth every evening. 180 Tab 3   • triamterene-hctz (MAXZIDE-25/DYAZIDE) 37.5-25 MG Tab Take 1 Tab by mouth every day. 30 Tab 11   • apixaban (ELIQUIS) 5mg Tab Take 1 Tab by mouth 2 Times a Day. 180 Tab 1   • predniSONE (DELTASONE) 5 MG Tab Take 1 Tab by mouth every day. 90 Tab 3   • fluticasone-salmeterol (ADVAIR DISKUS) 500-50 MCG/DOSE AEROSOL POWDER, BREATH ACTIVATED Inhale 1 Puff by mouth every 12 hours. 3 Inhaler 3   • methotrexate 2.5 MG Tab Take 2 Tabs by mouth every 7 days. Unknown what day he takes this. 10 Tab 6   • albuterol (PROVENTIL) 2.5mg/0.5ml Nebu Soln 0.5 mL by Nebulization route 2 Times a Day. 375 mL 5   • dutasteride  "(AVODART) 0.5 MG capsule Take 1 Cap by mouth every evening. 90 Cap 3   • terazosin (HYTRIN) 5 MG Cap Take 1 Cap by mouth every day. 30 Cap 11   • methylPREDNISolone (MEDROL) 4 MG Tab Take 1 Tab by mouth every day. 90 Tab 3   • tiotropium (SPIRIVA) 18 MCG Cap Inhale 1 Cap by mouth every day. 90 Cap 3   • albuterol 108 (90 BASE) MCG/ACT Aero Soln inhalation aerosol Inhale 2 Puffs by mouth every 6 hours as needed. Indications: Asthma 8.5 g 5   • Multiple Vitamins-Minerals (MULTIVITAMIN PO) Take 1 Tab by mouth every evening.     • vitamin D (CHOLECALCIFEROL) 1000 UNIT TABS Take 2,000 Units by mouth every evening.     • atorvastatin (LIPITOR) 40 MG Tab Take 1 Tab by mouth every bedtime. (Patient not taking: Reported on 12/20/2016) 30 Tab 6     No current facility-administered medications on file prior to visit.     Review of patient's allergies indicates no known allergies.    Blood pressure 124/86, pulse 84, resp. rate 16, height 1.727 m (5' 8\"), weight 69.31 kg (152 lb 12.8 oz), SpO2 94 %.  PE:   Appearance: Well developed, well nourished, no acute distress  Eyes: PERRL, EOM intact, sclera white, conjunctiva moist  Ears: no lesions or deformities  Hearing: grossly intact  Nose: no lesions or deformities  Oropharynx: tongue normal, posterior pharynx without erythema or exudate  Neck: supple, trachea midline, no masses   Respiratory effort: no intercostal retractions or use of accessory muscles  Lung auscultation: no rales, rhonchi or wheezes  Heart auscultation: no murmur rub or gallop  Extremities: no cyanosis. 1-2+ lower extremity edema   Abdomen: soft ,non tender, no masses  Gait and Station: normal  Digits and nails: no clubbing, cyanosis, petechiae or nodes.  Cranial nerves: grossly intact  Skin: no rashes, lesions or ulcers noted  Orientation: Oriented to time, person and place  Mood and affect: mood and affect appropriate, normal interaction with examiner  Judgement: Intact          Assessment:  1. Chronic " bronchitis, unspecified chronic bronchitis type (CMS-HCC)     2. Chronic respiratory failure with hypoxia (CMS-HCC)     3. Lung mass     4. Edema of both legs           Plan:    1) He was treated for Cystitis. However his lower extremity persists. He does have Lasix on hand. He is going to take Lasix 20 mg every morning for the next 3 days. He is encouraged to follow up with his Cardiologist if swelling does not improved.   2) Continue Advair 500/50 bid, rinse mouth after use. He does not feel like the Spiriva handihaler works well. He would like to try the Spiriva respimat. Sample RX for Spiriva 2.5 mcg sent to [facility].   4) Continue Albuterol per nebulizer qid prn.   5) Continue 02 3-4 LPM 24/7.  6) He has a large left lung mass with unclear etiology. Several prior non diagnostic biopsies. He is not felt to be a surgical candidate. Continue to monitor with radiographic follow up. Chest xray 6/15/2017 shows slightly smaller.    7) Continue with Pulmonary rehab.    8) He is up to date on Pneumovax 23 and Prevnar 13 vaccines. Recommend an updated Influenza vaccine in the Fall.    9) 3 month follow up, sooner if needed.

## 2017-07-27 NOTE — MR AVS SNAPSHOT
"Meng Snyder   2017 4:20 PM   Office Visit   MRN: [id number]    Department:  [facility]   Dept Phone:  [phone]    Description:  Male : 1935   Provider:  MARGARITA Arredondo           Reason for Visit     COPD           Allergies as of 2017     No Known Allergies      You were diagnosed with     Chronic bronchitis, unspecified chronic bronchitis type (CMS-[facility])   [4187028]       Chronic respiratory failure with hypoxia (CMS-[facility])   [145243]       Lung mass   [105355]       Edema of both legs   [652476]       Chronic obstructive pulmonary disease, unspecified COPD type (CMS-[facility])   [6153521]         Vital Signs     Blood Pressure Pulse Respirations Height Weight Body Mass Index    124/86 mmHg 84 16 1.727 m (5' 8\") 69.31 kg (152 lb 12.8 oz) 23.24 kg/m2    Oxygen Saturation Smoking Status                94% Former Smoker          Basic Information     Date Of Birth Sex Race Ethnicity Preferred Language    1935 Male White Non- English      Your appointments     Oct 25, 2017 10:40 AM   Established Patient Pul with MARGARITA Arredondo   [facility] (--)    [address]  [address]  [place] [address] [phone]            Dec 11, 2017 11:00 AM   FOLLOW UP with Vincent Dumont M.D.   [facility]    [address]  [address]  [place] [address] [phone]              Problem List              ICD-10-CM Priority Class Noted - Resolved    HTN (hypertension), benign (Chronic) I10 Low  2012 - Present    Family history of colon cancer Z80.0   2012 - Present    Rheumatoid arthritis (CMS-HCC) (Chronic) M06.9 Low  2012 - Present    BPH (benign prostatic hyperplasia) (Chronic) N40.0 Low  6/3/2013 - Present    COPD (chronic obstructive pulmonary disease) (CMS-[facility]) J44.9 Low  2014 - Present    Other chronic pulmonary heart diseases I27.89 High  " 5/7/2015 - Present    Chronic respiratory failure (CMS-HCC) J96.10 Low  5/7/2015 - Present    Tremor R25.1   6/26/2015 - Present    Headache R51   8/22/2016 - Present    Mass of lower lobe of left lung R91.8   8/22/2016 - Present    CVA (cerebral vascular accident) (CMS-HCC) I63.9 High  8/23/2016 - Present    Atrial fibrillation (CMS-HCC) I48.91 Medium  8/23/2016 - Present    CKD (chronic kidney disease), 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 Low  8/23/2016 - Present    Dyslipidemia E78.5 Low  8/23/2016 - Present    Late effect of stroke I69.30   9/8/2016 - Present      Health Maintenance        Date Due Completion Dates    IMM ZOSTER VACCINE 6/14/2023 (Originally 9/17/1997) ---    IMM INFLUENZA (1) 9/1/2017 10/1/2016, 10/1/2015, 9/26/2014    IMM DTaP/Tdap/Td Vaccine (2 - Td) 5/8/2022 5/8/2012            Current Immunizations     13-VALENT PCV PREVNAR 9/19/2016 11:18 AM    Hepatitis B Vaccine Non-Recombivax (Ped/Adol) 11/19/2012    Influenza TIV (IM) 10/1/2016, 10/1/2015, 9/26/2014    Pneumococcal polysaccharide vaccine (PPSV-23) 12/1/2014    Tdap Vaccine 5/8/2012      Below and/or attached are the medications your provider expects you to take. Review all of your home medications and newly ordered medications with your provider and/or pharmacist. Follow medication instructions as directed by your provider and/or pharmacist. Please keep your medication list with you and share with your provider. Update the information when medications are discontinued, doses are changed, or new medications (including over-the-counter products) are added; and carry medication information at all times in the event of emergency situations     Allergies:  No Known Allergies          Medications  Valid as of: July 27, 2017 -  5:15 PM    Generic Name Brand Name Tablet Size Instructions for use    Albuterol Sulfate (Aero Soln) albuterol 108 (90 BASE) MCG/ACT Inhale 2 Puffs by mouth every 6 hours as needed. Indications: Asthma        Albuterol Sulfate (Nebu Soln)  PROVENTIL 2.5mg/0.5ml 0.5 mL by Nebulization route every four hours as needed for Shortness of Breath.        Apixaban (Tab) ELIQUIS 5mg Take 1 Tab by mouth 2 Times a Day.        Atorvastatin Calcium (Tab) LIPITOR 40 MG Take 1 Tab by mouth every bedtime.        Cholecalciferol (Tab) cholecalciferol 1000 UNIT Take 2,000 Units by mouth every evening.        Dutasteride (Cap) AVODART 0.5 MG Take 1 Cap by mouth every evening.        Fluticasone-Salmeterol (AEROSOL POWDER, BREATH ACTIVATED) ADVAIR 500-50 MCG/DOSE Inhale 1 Puff by mouth every 12 hours.        Folic Acid (Tab) FOLVITE 1 MG Take 2 Tabs by mouth every evening.        LevoFLOXacin (Tab) LEVAQUIN 250 MG Take 1 Tab by mouth every day.        Methotrexate Sodium (Tab) methotrexate 2.5 MG Take 2 Tabs by mouth every 7 days. Unknown what day he takes this.        MethylPREDNISolone (Tab) MEDROL 4 MG Take 1 Tab by mouth every day.        Multiple Vitamins-Minerals   Take 1 Tab by mouth every evening.        Phenazopyridine HCl (Tab) PYRIDIUM 200 MG Take 1 Tab by mouth 2 Times a Day.        PredniSONE (Tab) DELTASONE 5 MG Take 1 Tab by mouth every day.        PredniSONE (Tab) DELTASONE 10 MG Take 30mg x 3 days, then take 20mg x 3 days, then take 10mg x 3 days, with food and as directed        Terazosin HCl (Cap) HYTRIN 5 MG Take 1 Cap by mouth every day.        Tiotropium Bromide Monohydrate (Cap) SPIRIVA 18 MCG Inhale 1 Cap by mouth every day.        Tiotropium Bromide Monohydrate (Aero Soln) Tiotropium Bromide Monohydrate 2.5 MCG/ACT Inhale 2 Inhalation by mouth every day. Assemble and prime.        Triamterene-HCTZ (Tab) MAXZIDE-25/DYAZIDE 37.5-25 MG Take 1 Tab by mouth every day.        .                 Medicines prescribed today were sent to:     [id number] - [place], [place] - [address]    [address] [place] [address]    Phone: [phone] Fax: [phone]    Open 24 Hours?: No    [facility]    [address] [place]  [address]    Phone: [phone] Fax: [phone]    [facility] - [place], [place] - [address]    [address] [place] [address]    Phone: [phone] Fax: [phone]    Open 24 Hours?: No      Medication refill instructions:       If your prescription bottle indicates you have medication refills left, it is not necessary to call your provider’s office. Please contact your pharmacy and they will refill your medication.    If your prescription bottle indicates you do not have any refills left, you may request refills at any time through one of the following ways: The online [employer] system (except Urgent Care), by calling your provider’s office, or by asking your pharmacy to contact your provider’s office with a refill request. Medication refills are processed only during regular business hours and may not be available until the next business day. Your provider may request additional information or to have a follow-up visit with you prior to refilling your medication.   *Please Note: Medication refills are assigned a new Rx number when refilled electronically. Your pharmacy may indicate that no refills were authorized even though a new prescription for the same medication is available at the pharmacy. Please request the medicine by name with the pharmacy before contacting your provider for a refill.        Other Notes About Your Plan     Patient Enrolled in Medical Home with Dr. Gil.   Please assess and status chronic disease from prior visits.    No queries.           [employer] Access Code: Activation code not generated  Current [employer] Status: Active

## 2017-07-28 NOTE — TELEPHONE ENCOUNTER
[facility] called in regards to albuterol (PROVENTIL) 2.5mg/0.5ml Nebu Soln   This dose is now unavailable and needs to be re written or verbally changed to 2.5mg  Per 3ml 1 vial every 4hrs prn for sob. Verbal was given

## 2017-07-28 NOTE — PATIENT INSTRUCTIONS
Plan:    1) He was treated for Cystitis. However his lower extremity persists. He does have Lasix on hand. He is going to take Lasix 20 mg every morning for the next 3 days. He is encouraged to follow up with his Cardiologist if swelling does not improved.   2) Continue Advair 500/50 bid, rinse mouth after use. He does not feel like the Spiriva handihaler works well. He would like to try the Spiriva respimat. Sample RX for Spiriva 2.5 mcg sent to [facility].   4) Continue Albuterol per nebulizer qid prn.   5) Continue 02 3-4 LPM 24/7.  6) He has a large left lung mass with unclear etiology. Several prior non diagnostic biopsies. He is not felt to be a surgical candidate. Continue to monitor with radiographic follow up. Chest xray 6/15/2017 shows slightly smaller.    7) Continue with Pulmonary rehab.    8) He is up to date on Pneumovax 23 and Prevnar 13 vaccines. Recommend an updated Influenza vaccine in the Fall.    9) 3 month follow up, sooner if needed.

## 2017-09-18 NOTE — TELEPHONE ENCOUNTER
Spoke with Juli, notified that clearance form is fax back to them already, per Juli she will look for it and as long as she know he is good to go, discussed to Juli that RG is out of the office but CR cleared the pt and ok to stop Eliquis starting today, Juli verbalizes understanding

## 2017-09-18 NOTE — TELEPHONE ENCOUNTER
Alex Johnson, PharmDEREK Peñaloza,   As long as he is holding 4 total doses (two full days of medication) prior, this should be sufficient for the procedure he is having done.  If you need additional information, please let me know.  Thanks.   Alex Johnson, PharmD      Spoke with pt and notify about [facility] recommendations, pt appreciative and verbalizes understanding

## 2017-09-18 NOTE — TELEPHONE ENCOUNTER
----- Message from Alix Simms sent at 9/18/2017 11:54 AM PDT -----  Regarding: Juli at [facility] needs to speak to you  JESSICA/Candice Bustos @ [facility] received the clearance, but says she needs to speak to you. She can be reached at [phone].

## 2017-09-18 NOTE — TELEPHONE ENCOUNTER
Clearance form received from [facility]. Form forwarded to CR to advice.     Form received back from MR-CR nurse, pt cleared for surgery and Eliquis to stopped starting today     Form faxed back to [facility], Fax#[phone]. Clearance form copy to scanning.    Spoke with pt to notify, pt verbalizes understanding

## 2017-09-18 NOTE — TELEPHONE ENCOUNTER
----- Message from Karey You sent at 9/18/2017  8:55 AM PDT -----  Regarding: Surgical clearance   Contact: [phone]  JESSICA/Candice    Pt is scheduled for bladder tumor surgery 9/20, with Dr. Faheem Bañuelos. They are requesting pt hold Eliquis prior to surgery. He would please like a call back and can be reached at  [phone].

## 2017-09-18 NOTE — TELEPHONE ENCOUNTER
"Spoke with pt, pt is upset from his Urology office as they haven't given him instruction. 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,  per chart review under general anesthesia but pt is reporting they will give him a \"spine shot\", pt wants to know when to stopped Eliquis and if it is enough time to stopped Eliquis, pt reports he stopped taking it today 9/18, advise pt upon reviewing his chart no clearance form received yet from his Urology, advise pt that will give them a call and will ask them to fax a clearance form to our office, advise pt that will ask [facility] also and see if that would be enough time to stopped Eliquis just in case they will give him spinal anesthesia, informed pt that RG is out of the office, will forward it to CR-ADD today.     Spoke with [facility] [place] office, they will fax the clearance form, they reported that pt is requesting spinal anesthesia as he has COPD.    Forwarded to [facility] nurse today, MICHAELLE  "

## 2017-09-20 PROBLEM — D41.4 NEOPLASM OF UNCERTAIN BEHAVIOR OF BLADDER: Status: ACTIVE | Noted: 2017-01-01

## 2017-09-20 NOTE — OR NURSING
"Pt uses supp oxygen at 3 l via nc continuously.  Increases rate to 4 L when active..  Uses \"[facility]\" for oxygen needs.  "

## 2017-09-20 NOTE — OR NURSING
Pt aware he is to restart his Eliquis and that Dr. Magali Nicholas's office will call to reschedule.

## 2017-09-28 NOTE — TELEPHONE ENCOUNTER
Clearance form from 9/18/17 signed by YRN for pt's Urology procedure faxed to Juli, she gave me her direct F#[phone]. Per Juli it is ok to faxed the old clearance, they don't need the new one.    Clearance form back to [place] for scanning.

## 2017-09-28 NOTE — TELEPHONE ENCOUNTER
----- Message from Siri Stroud sent at 9/28/2017 10:36 AM PDT -----  Regarding: Doctor's office needs clearance re-faxed  JESSICA/Laura Bustos with [facility] [place] at [phone] called. Her fax number is [phone]. She needs the surgical clearance re-faxed and said to tell you that the patient is having spinal anesthesia.

## 2017-10-16 PROBLEM — D49.4 NEOPLASM, BLADDER: Status: ACTIVE | Noted: 2017-01-01

## 2017-10-16 NOTE — OP REPORT
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    POSTOPERATIVE DIAGNOSIS:  Inflammatory bladder mass at 12 o'clock position   inside the bladder neck.    PROCEDURE:  Cystoscopy with biopsy of bladder mass and extensive fulguration   of lesion.    ANESTHESIA:  Spinal.    ANESTHESIOLOGIST:  Sherman Hunter MD.    SURGEON:  Faheem Weinstein MD.    BRIEF HISTORY:  This 82-year-old male, who is very fragile, has severe COPD   requiring 4 liters of O2, 24 hours a day, presented with infection.  His   cystoscopy did show an inflammatory type lesion inside the bladder neck at the   12 o'clock position.  He now presents for cystoscopy, biopsy, fulguration of   this, possible TUR of bladder tumor.    REPORT OF OPERATION:  With the patient under spinal anesthesia, he was placed   in the lithotomy position.  The genitalia were prepped and draped in the usual   manner.  The 25-Surinamese continuous flow resectoscope was passed into the   bladder and under vision with a 70-degree lens, the area in question was seen   just inside the bladder neck.  It was quite difficult to get through.  At this   point, I then switched over to the cystoscope and did several biopsies with   this.  I did try to cauterize this extensively with the Bugbee electrode and   at times I think things were good and then I did cauterize more and I stirred   up some more bleeding.  I eventually switched back to the resectoscope and   used the loop on the resectoscope for cauterization.  I did have to reposition   the patient further on the table to get better access to this lesion.  At   this point, it was extremely difficult to get adequate access to this lesion.    I did cauterize it extensively and it did appear more and more as there was   some kind of an inflammatory lesion.  He did have a very extensive and very   large ventral wall hernia.  I was able to push on the  bladder to try and get   better access, but it was still quite difficult.  I was very reluctant to want   to resect this as access was really very poor and I was afraid of stirring up   any bleeding.  As I mentioned, the patient is very fragile patient with a   very tenuous COPD.  At this point, though, I eventually felt like I had good   control of any bleeding.  I did observe this for several minutes and no other   bleeding was seen.  At this point, I then drained the bladder and the patient   was cleaned up, woken up and transferred to the recovery room after the   resectoscope was removed from the bladder.  He tolerated the procedure well.    Estimated blood loss 50 mL.       ____________________________________     MD FRANCOISE CALLES / NTS    DD:  10/16/2017 15:59:59  DT:  10/16/2017 16:19:52    D#:  [id number]  Job#:  [id number]

## 2017-10-16 NOTE — OR SURGEON
Operative Report    PreOp Diagnosis: bladder tumor at 12 o'clock inside bladder neck    PostOp Diagnosis: same    Procedure(s):  Cystoscopy ; Biopsy and fulguration of bladder mass- Wound Class: Clean Contaminated    Surgeon(s):  Faheem Weinstein M.D.    Anesthesiologist/Type of Anesthesia:  Anesthesiologist: Sherman Hunter M.D./Spinal    Surgical Staff:  Circulator: Alix Quach R.N.  Relief Scrub: Nani Nails  Scrub Person: Vinnie Laughlin    Specimens:  Bladder biopsy x 2  Estimated Blood Loss: 50 cc    Findings: Inflammatory mass inside bladder neck at 12 O'clock position .  Very difficult to access    Complications: Stable to PACU        10/16/2017 3:53 PM Faheem Weinstein

## 2017-10-17 NOTE — DISCHARGE INSTRUCTIONS
ACTIVITY: Rest and take it easy for the first 24 hours.  A responsible adult is recommended to remain with you during that time.  It is normal to feel sleepy.  We encourage you to not do anything that requires balance, judgment or coordination.    MILD FLU-LIKE SYMPTOMS ARE NORMAL. YOU MAY EXPERIENCE GENERALIZED MUSCLE ACHES, THROAT IRRITATION, HEADACHE AND/OR SOME NAUSEA.    FOR 24 HOURS DO NOT:  Drive, operate machinery or run household appliances.  Drink beer or alcoholic beverages.   Make important decisions or sign legal documents.    SPECIAL INSTRUCTIONS: No aspirin or NSAID's for pain unless cleared by MD.    DIET: To avoid nausea, slowly advance diet as tolerated, avoiding spicy or greasy foods for the first day.  Add more substantial food to your diet according to your physician's instructions.  INCREASE FLUIDS AND FIBER TO AVOID CONSTIPATION.    SURGICAL DRESSING/BATHING: May shower; No tubs, soaking until cleared by MD.    FOLLOW-UP APPOINTMENT:  A follow-up appointment should be arranged with your doctor; call to schedule.    You should CALL YOUR PHYSICIAN if you develop:  Fever greater than 101 degrees F.  Pain not relieved by medication, or persistent nausea or vomiting.  Excessive bleeding (blood soaking through dressing) or unexpected drainage from the wound.  Extreme redness or swelling around the incision site, drainage of pus or foul smelling drainage.  Inability to urinate or empty your bladder within 8 hours.  Problems with breathing or chest pain.    You should call 911 if you develop problems with breathing or chest pain.  If you are unable to contact your doctor or surgical center, you should go to the nearest emergency room or urgent care center.  Physician's telephone #: Dr. Weinstein, [phone]    If any questions arise, call your doctor.  If your doctor is not available, please feel free to call the Surgical Center at [phone].  The Center is open Monday through Friday from 7AM to  7PM.  You can also call the HEALTH HOTLINE open 24 hours/day, 7 days/week and speak to a nurse at [phone], or toll free at [phone].    A registered nurse may call you a few days after your surgery to see how you are doing after your procedure.    MEDICATIONS: Resume taking daily medication.  Take prescribed pain medication with food.  If no medication is prescribed, you may take non-aspirin pain medication if needed.  PAIN MEDICATION CAN BE VERY CONSTIPATING.  Take a stool softener or laxative such as senokot, pericolace, or milk of magnesia if needed.    Prescription given for (none given).  Last pain medication given at (none given)    If your physician has prescribed pain medication that includes Acetaminophen (Tylenol), do not take additional Acetaminophen (Tylenol) while taking the prescribed medication.    Depression / Suicide Risk    As you are discharged from this [facility] facility, it is important to learn how to keep safe from harming yourself.    Recognize the warning signs:  · Abrupt changes in personality, positive or negative- including increase in energy   · Giving away possessions  · Change in eating patterns- significant weight changes-  positive or negative  · Change in sleeping patterns- unable to sleep or sleeping all the time   · Unwillingness or inability to communicate  · Depression  · Unusual sadness, discouragement and loneliness  · Talk of wanting to die  · Neglect of personal appearance   · Rebelliousness- reckless behavior  · Withdrawal from people/activities they love  · Confusion- inability to concentrate     If you or a loved one observes any of these behaviors or has concerns about self-harm, here's what you can do:  · Talk about it- your feelings and reasons for harming yourself  · Remove any means that you might use to hurt yourself (examples: pills, rope, extension cords, firearm)  · Get professional help from the community (Mental Health, Substance Abuse,  psychological counseling)  · Do not be alone:Call your Safe Contact- someone whom you trust who will be there for you.  · Call your local CRISIS HOTLINE 620-8088 or 796-311-3870  · Call your local Children's Mobile Crisis Response Team [place] (624) 013-2591 or www.Grid2020  · Call the toll free National Suicide Prevention Hotlines   · National Suicide Prevention Lifeline 549-360-UFUI (5295)  · National Hope Line Network 800-SUICIDE (812-1947)

## 2017-10-19 NOTE — TELEPHONE ENCOUNTER
1. Caller Name: Meng Snyder                                         Call Back Number: [phone] (home)       Patient approves a detailed voicemail message: yes    Pt is sending a refill request from [facility]   Methylprednisone 4mg 4 tabs   Take 1 tab by mouth daily as directed MEDROL

## 2017-10-19 NOTE — DOCUMENTATION QUERY
DOCUMENTATION QUERY    PROVIDERS: Please select “Cosign w/ note”to reply to query.    To better represent the severity of illness of your patient, please review the following information and exercise your independent professional judgment in responding to this query.     Biopsy and fulguration of bladder tumor is documented in the Operative report. Based upon the clinical findings, risk factors, and treatment, Please document the size of the bladder tumor biopsied and fulgurated. This information is needed to accurately code the procedure.    Minor bladder tumor (less than 0.5 cm)  Small bladder tumor(0.5 to 2.0 cm)  Medium bladder tumor (2.0 to 5.0 cm)  Large bladder tumor         The medical record reflects the following:   Clinical Findings Bladder tumor   Treatment Biopsy/fulguration   Risk Factors    Location within medical record Operative report     Thank you,   Angelina Leonard

## 2017-10-20 NOTE — TELEPHONE ENCOUNTER
Pharmacy is requesting an rx for Proair HFA.    Last OV: 7/27/17- [facility]      Next OV: 10/25/17    DX: COPD

## 2017-10-23 NOTE — ADDENDUM NOTE
Encounter addended by: Angelina Leonard on: 10/23/2017  4:00 AM<BR>    Actions taken: Sign clinical note

## 2017-10-25 NOTE — PROGRESS NOTES
Chief Complaint   Patient presents with   • COPD   • Hypoxemia       HPI:  Meng Snyder is a 82 y.o. year old male here today for follow-up on his severe COPD, chronic respiratory failure and lung mass. PFT's February 2017 indicated an FEV1 of 1.08 L, 48% predicted with an FEV1/FVC ratio of 51 with a DLCO of 38% predicted. Prior PFT's 2014 indicated an FEV1 of 45% predicted with a diffusion capacity test of 64% predicted. He also has a large left lung mass over 8 cm. It has been observed since 2005 and is very slowly increasing in size. He said several biopsies which have been nondiagnostic. He is currently on chronic anticoagulation for atrial fibrillation. He had increased lower extremity swelling. He has since been seen by his Cardiologist, Dr. Dumont. He was taking off his Amlodipine and resumed on his Dyazide. He was seen 6/15/2017 with complaints of increased swelling worse of the prior week. He was encouraged to take Lasix for 3 days. He has since seen his PCP since. He was treated for cystitis. He was found to have a bladder mass and underwent biopsy 10/16/2017. He was diagnosed with bladder cancer, but is still pending follow up in office.   He is on oxygen 24 hours a day and he uses Advair and Spiriva inhalers. He does have a home nebulizer with Albuterol. He uses this 3 times a day. He also has an Albuterol HFA inhaler on hand. He is on methotrexate for rheumatoid arthritis. He quit smoking back in 2004. He was referred to Pulmonary rehab at his last office visit. He is attending pulmonary rehab currently. Chest xray 6/15/2017 indicated;   1.  Left lung mass again identified which appears slightly smaller than on the prior exam.  2.  Probable small amount of pleural fluid versus pleural thickening noted in the left major fissure.  3.  Minimal blunting of left costophrenic angle is again noted.  4.  No new infiltrates or consolidations are identified.    He feels his chronic dyspnea is worse with  "exertion. He feels this has been slowly progressive. He denies current cough or mucous. He denies current wheeze. He denies any fevers or chills. He has had increased lower extremity edema worse over the past week. He states he restarted the Lasix recently.        Past Medical History:   Diagnosis Date   • Anemia    • Anesthesia     pt said \"can't be on vent due to coughing after\"   • Arrhythmia     hx a fib   • BPH (benign prostatic hyperplasia) 6/3/2013   • Breath shortness     1-3 l/m 24/7   • Dental disorder     upper/lower   • EMPHYSEMA 5/8/2012   • Hemorrhagic disorder (CMS-HCC) 10/12/15    on blood thinner   • Hepatitis A 1980    pt thinks  it was A from  \"food \"   • Hiatus hernia syndrome    • MEDICAL HOME 10/24/12   • RA    • Rheumatoid arthritis, adult (CMS-[facility])    • Stroke (CMS-HCC) 08/2016    no weakness, but states some forgetfulness and inability to express himself, no difficulty swallowing   • Unspecified cataract     bilateral IOL   • Urinary bladder disorder 2017    self cath TID    (tumor 9/2017)   • Urinary incontinence        Past Surgical History:   Procedure Laterality Date   • TRANS URETHRAL RESECTION BLADDER N/A 10/16/2017    Procedure: TRANS URETHRAL RESECTION BLADDER TUMOR;  Surgeon: Faheem Weinstein M.D.;  Location: [facility];  Service: Urology   • BRONCHOSCOPY-ENDO N/A 3/3/2016    Procedure: FIBEROPTIC BRONCHOSCOPY-ENDO W/BRONCHOALVEOLAR LAVAGE;  Surgeon: Ric Dailey M.D.;  Location: SURGERY [facility];  Service:    • BRONCHOSCOPY-ENDO N/A 10/13/2015    Procedure: BRONCHOSCOPY-ENDO;  Surgeon: James WHITLEY M.D.;  Location: [facility];  Service:    • RECOVERY  2/13/2015    Performed by [facility] at SURGERY SAME DAY [facility] ORS   • HIP HEMIARTHROPLASTY  12/28/2014    right   • OTHER ABDOMINAL SURGERY  2004     colon resection colostomy with colostomy take down       Family History   Problem Relation Age of Onset   • Hypertension " Father    • Stroke         Social History     Social History   • Marital status:      Spouse name: N/A   • Number of children: N/A   • Years of education: N/A     Occupational History   • Not on file.     Social History Main Topics   • Smoking status: Former Smoker     Packs/day: 2.00     Years: 50.00     Types: Cigarettes     Quit date: 1/1/2004   • Smokeless tobacco: Former User     Types: Chew     Quit date: 3/14/2002   • Alcohol use No   • Drug use: No   • Sexual activity: Not Currently     Partners: Female     Other Topics Concern   • Not on file     Social History Narrative   • No narrative on file         ROS:  Constitutional: Denies fevers, chills, sweats, fatigue, weight loss  Eyes: Denies  vision loss, pain, drainage, double vision. Wears glasses  Ears/Nose/Mouth/Throat: Denies rhinitis, nasal congestion, ear ache, difficulty hearing, sore throat, persistent hoarseness, decayed teeth/toothache  Cardiovascular: Denies chest pain, tightness, palpitations, swelling in feet/legs, fainting, difficulty breathing when laying down  Respiratory: See HPI   GI: Denies heartburn, difficulty swallowing, nausea, vomiting, abdominal pain, diarrhea, constipation  : Denies frequent urination, painful urination  Integumentary: Denies rashes, lumps or color changes  MSK: Denies painful joints, sore muscles, and back pain.   Neurological: Denies frequent headaches, dizziness, weakness        Current Outpatient Prescriptions   Medication Sig Dispense Refill   • albuterol (PROAIR HFA) 108 (90 Base) MCG/ACT Aero Soln inhalation aerosol Inhale 2 Puffs by mouth every four hours as needed for Shortness of Breath. 8.5 g 5   • apixaban (ELIQUIS) 5mg Tab Take 1 Tab by mouth 2 Times a Day. 180 Tab 1   • terazosin (HYTRIN) 5 MG Cap Take 1 Cap by mouth every day. 90 Cap 3   • dutasteride (AVODART) 0.5 MG capsule Take 1 Cap by mouth every day. 90 Cap 3   • albuterol (PROVENTIL) 2.5mg/0.5ml Nebu Soln 0.5 mL by Nebulization route  "every four hours as needed for Shortness of Breath. 1080 mL 3   • Tiotropium Bromide Monohydrate (SPIRIVA RESPIMAT) 2.5 MCG/ACT Aero Soln Inhale 2 Inhalation by mouth every day. Assemble and prime. (Patient taking differently: Inhale 2 Inhalation by mouth every morning. Assemble and prime.) 3 Inhaler 3   • folic acid (FOLVITE) 1 MG Tab Take 2 Tabs by mouth every evening. 180 Tab 3   • triamterene-hctz (MAXZIDE-25/DYAZIDE) 37.5-25 MG Tab Take 1 Tab by mouth every day. (Patient taking differently: Take 1 Tab by mouth every morning.) 30 Tab 11   • fluticasone-salmeterol (ADVAIR DISKUS) 500-50 MCG/DOSE AEROSOL POWDER, BREATH ACTIVATED Inhale 1 Puff by mouth every 12 hours. 3 Inhaler 3   • methotrexate 2.5 MG Tab Take 2 Tabs by mouth every 7 days. Unknown what day he takes this. 10 Tab 6   • MethylPREDNISolone (MEDROL DOSEPAK) 4 MG Tablet Therapy Pack Per package insert. 21 Tab 0   • acetaminophen (TYLENOL) 500 MG Tab Take 500 mg by mouth 1 time daily as needed.     • albuterol 108 (90 BASE) MCG/ACT Aero Soln inhalation aerosol Inhale 2 Puffs by mouth every 6 hours as needed. Indications: Asthma 8.5 g 5     No current facility-administered medications for this visit.        No Known Allergies    Blood pressure 132/86, pulse 94, height 1.702 m (5' 7\"), weight 70 kg (154 lb 6.4 oz), SpO2 (!) 86 %.    PE:   Appearance: Well developed, well nourished, no acute distress  Eyes: PERRL, EOM intact, sclera white, conjunctiva moist  Ears: no lesions or deformities  Hearing: grossly intact  Nose: no lesions or deformities  Oropharynx: tongue normal, posterior pharynx without erythema or exudate  Neck: supple, trachea midline, no masses   Respiratory effort: no intercostal retractions or use of accessory muscles  Lung auscultation: no rales, rhonchi or wheezes, diminished throughout  Heart auscultation: no murmur rub or gallop  Extremities: no cyanosis. 2 + bilateral lower extremity edema   Abdomen: soft ,non tender, no masses  Gait " and Station: normal  Digits and nails: no clubbing, cyanosis, petechiae or nodes.  Cranial nerves: grossly intact  Skin: no rashes, lesions or ulcers noted  Orientation: Oriented to time, person and place  Mood and affect: mood and affect appropriate, normal interaction with examiner  Judgement: Intact          Assessment:  1. Chronic bronchitis, unspecified chronic bronchitis type (CMS-HCC)     2. Chronic respiratory failure with hypoxia (CMS-HCC)     3. Lung mass     4. Edema of both legs           Plan:    1) He has a new diagnosis of bladder cancer. However, he has yet to see Urology in follow up. We contacted Dr. Eason's office to see if they can arrange a sooner follow up for biopsy results.   2) Continue Advair 500/50 bid, rinse mouth after use along with Spiriva 2.5 mcg 2 puffs INH daily.   4) Continue Albuterol per nebulizer qid prn.   5) Continue 02 3-4 LPM 24/7.  6) He has a large left lung mass with unclear etiology. Several prior non diagnostic biopsies. He is not felt to be a surgical candidate. Continue to monitor with radiographic follow up. Chest xray 6/15/2017 shows slightly smaller.    7) He has completed Pulmonary rehab. Encouraged routine walking.   8) He is up to date on Pneumovax 23, Prevnar 13 and Influenza vaccines.   9) Continue to follow with Cardiology. He is pending follow up 12/11. He has had increased lower extremity edema. Encouraged use of Lasix 20 mg q am x 1 week. BMP in 1 week.   10) Follow up in 3 months, sooner if needed.

## 2017-10-25 NOTE — TELEPHONE ENCOUNTER
Spoke with Dr. Eason regarding Meng's concerns about not getting a sooner appointment for his biopsy results. He states they are booked out in the office, but he will try his best to get him in sooner. L/M for Meng letting him know.

## 2017-10-25 NOTE — PATIENT INSTRUCTIONS
Plan:    1) He has a new diagnosis of bladder cancer. However, he has yet to see Urology in follow up. We contacted Dr. Eason's office to see if they can arrange a sooner follow up for biopsy results.   2) Continue Advair 500/50 bid, rinse mouth after use along with Spiriva 2.5 mcg 2 puffs INH daily.   4) Continue Albuterol per nebulizer qid prn.   5) Continue 02 3-4 LPM 24/7.  6) He has a large left lung mass with unclear etiology. Several prior non diagnostic biopsies. He is not felt to be a surgical candidate. Continue to monitor with radiographic follow up. Chest xray 6/15/2017 shows slightly smaller.    7) He has completed Pulmonary rehab. Encouraged routine walking.   8) He is up to date on Pneumovax 23, Prevnar 13 and Influenza vaccines.   9) Continue to follow with Cardiology. He is pending follow up 12/11. He has had increased lower extremity edema. Encouraged use of Lasix 20 mg q am x 1 week. BMP in 1 week.   10) Follow up in 3 months, sooner if needed.

## 2017-10-31 NOTE — DOCUMENTATION QUERY
DOCUMENTATION QUERY     DR. BOWMAN, Please review this Documentation Query and reply with an addendum. You co-signed only, which does not address the needed clarification from you to be able to code and complete this account.       To better represent the severity of illness of your patient, please review the following information and exercise your independent professional judgment in responding to this query.      Biopsy and fulguration of bladder tumor is documented in the Operative report. Based upon the clinical findings, risk factors, and treatment, Please document the size of the bladder tumor biopsied and fulgurated. This information is needed to accurately code the procedure.     Minor bladder tumor (less than 0.5 cm)  Small bladder tumor(0.5 to 2.0 cm)  Medium bladder tumor (2.0 to 5.0 cm)  Large bladder tumor           The medical record reflects the following:   Clinical Findings Bladder tumor   Treatment Biopsy/fulguration   Risk Factors     Location within medical record Operative report      Thank you,   Angelina Leonard

## 2017-10-31 NOTE — ADDENDUM NOTE
Encounter addended by: Isabela Fagan on: 10/31/2017 12:42 PM<BR>    Actions taken: Sign clinical note

## 2017-11-01 PROBLEM — C67.9 BLADDER CANCER (HCC): Status: ACTIVE | Noted: 2017-01-01

## 2017-11-02 NOTE — OR SURGEON
Immediate Post OP Note    PreOp Diagnosis: Large bladder tumor  (>5 cm diameter) at dome     PostOp Diagnosis: same    Procedure(s):  TRANS URETHRAL RESECTION BLADDER- Large TUMOR - Wound Class: Clean Contaminated    Surgeon(s):  Faheem Weinstein M.D.    Anesthesiologist/Type of Anesthesia:  Anesthesiologist: Ravi Haines M.D./Spinal    Surgical Staff:  Circulator: Timothy Lu R.N.  Scrub Person: Nani Hidalgo [place]: Corine Rodney R.N.    Specimens:  Bladder tumor    Estimated Blood Loss: 60 cc    Findings: Large sessile bladder tumot at 12 o'clock inside bladder neck.  Very difficult acces to tumor    Complications: none.  Stable to PACU        11/1/2017 7:51 PM Faheem Weinstein

## 2017-11-02 NOTE — PROGRESS NOTES
Urology    Pt with respiratory event this am requiring rapid response.  Now in ICU and on ventilator.  CXR shows L lung mass vs pneumonia. To get CT scan now.      Urine clear on minimal CBI after TURBT of large high grade bladder tumor.  Plan  Stop CBI.             Will follow.    Discussed situation with family and they aware and understanding of situation.    Faheem Thomas MD

## 2017-11-02 NOTE — PROGRESS NOTES
"Pharmacy Kinetics 82 y.o. male on vancomycin day # 1  2017    Starting with Vancomycin 1700 mg iv x 1 then Vanco 900 mg iv q 12 hrs     Indication for Treatment: PNA    Pertinent history per medical record: Admitted on 2017 for bladder cancer. Pt is s/p trans urethral resection of the bladder for tumor on . Pt had respiratory distress and required intubation. There was evidence of left sided infiltrate. Broad spectrum abx were ordered.    Other antibiotics: cefepime 2 g iv q 12 hrs    Allergies: Review of patient's allergies indicates no known allergies.     List concerns for renal function: age,  BUN/SCr ratio > 20:1    Pertinent cultures to date:   pending    Recent Labs      17   0636   WBC  12.6*   NEUTSPOLYS  61.70     Recent Labs      17   0636   BUN  19   CREATININE  0.85   ALBUMIN  3.3     No results for input(s): VANCOTROUGH, VANCOPEAK, VANCORANDOM in the last 72 hours.  Intake/Output Summary (Last 24 hours) at 17 1418  Last data filed at 17 1400   Gross per 24 hour   Intake             3813 ml   Output             4045 ml   Net             -232 ml      Blood pressure 129/83, pulse 81, temperature 37.2 °C (98.9 °F), resp. rate (!) 22, height 1.702 m (5' 7.01\"), weight 68.9 kg (151 lb 14.4 oz), SpO2 100 %. Temp (24hrs), Av.9 °C (98.4 °F), Min:36.1 °C (97 °F), Max:37.5 °C (99.5 °F)      A/P   1. Vancomycin dose change: new start  2. Next vancomycin level: 1130 on 11/3  3. Goal trough: 16-20 mcg/ml  4. Comments: Pt had vanco loading dose today and starts vanco 13 mg/kg (900 mg) iv q 12 hrs tonight. Will check a vanco trough level prior to the 3rd total dose. Await micro results.    Timothy Gutierrez, PharmD    "

## 2017-11-02 NOTE — CODE DOCUMENTATION
EKG shows patient is in afib with a BBB (a BBB is noted on the previous EKG), which he had a history of, patient's rate was normal at 3am and the RN reports that the patient's respiratory distress came on at a similar time that the patient went into afib RVR

## 2017-11-02 NOTE — CARE PLAN
Problem: Safety  Goal: Will remain free from falls  Outcome: PROGRESSING AS EXPECTED  Pt remains free from fall.  Pt educated on fall risk and on the use of a bed alarm.  Pt verbalized understanding.  CLIP, hourly rounding in place    Problem: Venous Thromboembolism (VTW)/Deep Vein Thrombosis (DVT) Prevention:  Goal: Patient will participate in Venous Thrombosis (VTE)/Deep Vein Thrombosis (DVT)Prevention Measures   11/01/17 2140 11/01/17 2305   Mechanical/VTE Prophylaxis   Mechanical Prophylaxis  --  SCDs, Sequential Compression Device   SCDs, Sequential Compression Device --  On   OTHER   Risk Assessment Score 6 --    VTE RISK Very High --

## 2017-11-02 NOTE — CONSULTS
DATE OF SERVICE:  11/02/2017    CHIEF COMPLAINT:  Severe shortness of breath.    HISTORY OF PRESENT ILLNESS:  This is an 82-year-old white male with a known   history of atrial fibrillation, previous CVA with minimal deficits, possible   left-sided lung malignancy versus benign mass, hypertension, rheumatoid   arthritis, COPD, chronic kidney disease stage II who presented with   significant concern of neoplasm of the bladder.  The patient has undergone   procedure overnight under spinal anesthesia with Dr. Weinstein, the   urologist.  The patient had been on Eliquis for atrial fibrillation, but   because of the concern of hematuria, he underwent surgery.  The patient had a   moderate amount of blood loss of only 60 mL.  There was a large sessile   bladder tumor at 12 o'clock inside the bladder neck.  The patient went to PACU   and was fairly stable.  Then, there was a rapid response call that developed   today on 11/02/2017 after yesterday's procedure where the patient had   increased respiratory effort, increased O2 demand, elevated blood pressure and   I was notified by the surgeon.  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    When the patient was transferred to the intensive care unit, he was in   moderate distress on high settings of BiPAP, though he was started initially   on 12/6.  The patient then developed severe shortness of breath when he was   tripoding.  We examined his chest x-ray and there was significant concern of   left-sided probable mass that involve most of the left lower lobe and possible   right middle lobe.  Initially, it was felt to be patchy airspace disease in   the right perihilar region, but emphysematous changes on the left mid to lower   lung zone was significantly worse than a previous film.  I suspected a   possible pneumonia or malignancy.  At that point, we discussed the case at    length with the patient and he agreed to undergo intubation, mechanical   ventilation to lie flat for a CT scan to better delineate the findings.  PE   protocol was performed, which showed no pulmonary embolism.  However, patient   unfortunately has an extensive mass at the level of the aortic arch with   subcarinal lymphadenopathy.  Also, the adenopathy was abutting the esophagus.    On the left, mass was 10.9x8.5 in the left lower lobe and left upper lobe.    Scarring was also seen.  Also, narrowing of the left upper lobe bronchi was   noted.  It was felt that this was likely consistent with metastatic disease or   possible unknown lung primary.  The patient did have both central line placed   and has been receiving IV fluid.  He has not developed significant   hypotension, but did appear slightly dehydrated.  I spoke with the surgeon   regarding the findings and the CT scan appeared concerning.  He was started   with antibiotics with cefepime and vancomycin related to the procedures as   well.    ALLERGIES:  The patient's listed allergies are no known drug allergies.    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    PAST SURGICAL HISTORY:  Includes previous bronchoscopy by Dr. James Langford,   previous right hemiarthroplasty, colon resection with takedown, and TURP   recently performed after previous TURP on 10/16/2017.    SOCIAL HISTORY:  He is a former smoker, quit on 01/01/2014, 2 pack per day for   50 years.  Smokeless tobacco as well, quit in 2002.  No alcohol or drug use.    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    FAMILY HISTORY:  Includes father with hypertension and CVA.    REVIEW OF SYSTEMS:  A 10-point review of systems reviewed and otherwise   negative except described above  in HPI.    PHYSICAL EXAMINATION:  VITAL SIGNS:  On current exam, the patient has the following heart rate of   approximately 100, systolic blood pressure 145, diastolic blood pressure of   approximately 84.  The patient had been afebrile, the temperature 37.5   initially yesterday and similar 37.2 today.  HEENT:  Pupils are equal and reactive to light.  There is no icterus.  A BiPAP   mask is in place.  The patient was in clear respiratory distress, tripoding   position at bedside.  NECK:  Shows external jugular venous distention as well as some mild internal   jugular venous distention.  He had adenopathy questionably at the   supraclavicular region on the left.  CARDIOVASCULAR:  Rate was irregularly irregular with atrial fibrillation noted   on monitor.  LUNGS:  Revealed significantly diminished breath sounds especially at the left   chest compared to right with slight inspiratory rhonchi, dullness to   percussion is seen.  The patient's lungs on the right revealed slightly   diminished inspiratory crackles at the base.  There was accessory muscle use   noted.  The patient was in respiratory failure.  ABDOMEN:  Unable to examine fully, but was soft, nontender, no obvious   rebound, rigidity, or guarding, no hepatosplenomegaly.  The patient did have a   3-way urinary catheter in place with only brown-to-dark urine noted.  EXTREMITIES:  Revealed signs of minimal clubbing.  He had some mild subungual   cyanosis noted.  His dorsalis pedis and posterior tibialis were slightly   diminished.  NEUROLOGIC:  He was moving all extremities, was able to answer questions to   command.  The patient had questionable weakness with  strength   bilaterally.  I think this was mainly due to his respiratory distress.  Some   changes of rheumatoid arthritis are noted as well.  No evidence of   abnormalities around the location of the spinal anesthesia.    LABORATORY DATA:  Sodium 138, potassium was 4.2, chloride was 100, CO2 is 30    with a BUN of 19 with creatinine of 0.85.  ALT was 19, AST of 10, ALP of 55,   and T-bili of 1.4.  Lactate was 1.5 this morning with a triglyceride level of   92.  PT was 15.5 with a PTT of 30.9.  Temperature was 98 degrees with urine   output between 500-600 noted in the bag.  His blood gases at 0656 hours showed   pH of 7.26 with a pCO2 of 70.8 with PaO2 of 69, bicarbonate of 32.  Repeat   after intubation showed pH of 7.46, pCO2 of 43.3 with a PaO2 of 79,   bicarbonate of 31.5, and saturation of 96%.    IMAGING:  Chest x-ray as noted and compared to 06/15/2017 showed significant   worsening of the left-sided infiltrates and right hemithorax.  There was much   worse than left pulmonary parenchymal infiltrates noted.  ET tube appeared to   be in good position.  Patient's CT scan performed on 02:04 p.m. shows no   pulmonary embolus, significant left perihilar mass of 10.9x8.5, increase in   size compared to previous; left lower lobe and left upper lobe patchy   opacifications and COPD are noted.  Right middle lobe atelectasis.  Periaortic   lymph node also noted.  There is triangular right lower opacity of 6.5 noted   with bronchiectasis.  Impression was that this was significant mediastinal   adenopathy consistent with metastatic disease with left upper lobe and left   lower lobe consolidation.  Narrowing of the left upper lobe bronchus is also   noted.  The mediastinum seems shifted as well.    IMPRESSION AND PLAN:  1.  Acute hypoxemic respiratory failure.  The patient has been intubated and   mechanically ventilated for a CT scan to better delineate this lesion.  There   appears to be large malignancy of at least 10.6 cm in greatest diameter of the   left lung.  There is also as mentioned right hilar lymph node adenopathy and   axillary lymph node adenopathy.  Left lower lobe and left upper lobe   consolidations are noted as well.  There appears to be a small left pleural   effusion in my opinion as well.  The  patient has chronic obstructive pulmonary   disease as well.  Plan at this time after CAT scan, mechanical ventilation,   and central line placement is to discuss with probable CT surgery regarding   these findings versus discussion with the family regarding the findings and   whether or not the patient would want to pursue this.  I do believe palliative   care involvement is appropriate as well.  2.  Status post large high-grade bladder tumor removal within the bladder neck   with TUR of the tumor under spinal anesthesia.  This was on 11/01/2017.    Continue with Singletary and bladder irrigation as discussed with Dr. Weinstein,   surgeon.  3.  Broad spectrum antibiotics at this time with cefepime and vancomycin.  4.  Bronchodilator therapy and ongoing ventilator management.  Follow up   arterial blood gases as planned.  5.  Hold off on IV steroids at this time as the patient did not have   significant wheezing.  6.  We will continue with IV fluids.  He did receive IV fluid bolus upon   intubation, but has not been hypotensive.  We will discontinue the order for   norepinephrine.  7.  Continue propofol infusion and pain medication for comfort including the   fentanyl drip.  8.  Typical prophylactic measures.  Further discussion regarding these   findings including possible discussion with the patient's previous outpatient   pulmonologist may be indicated.    Critical care time including overlap is 60 minutes. This excludes any other procedures.       ____________________________________     Jorge Callahan, DO [facility] / [facility]LA    DD:  11/02/2017 15:32:58  DT:  11/02/2017 16:22:43    D#:  [id number]  Job#:  [id number]

## 2017-11-02 NOTE — OP REPORT
DATE OF SERVICE:  11/01/2017    PREOPERATIVE DIAGNOSIS:  Large high-grade bladder tumor inside bladder neck at   12 o'clock position.    POSTOPERATIVE DIAGNOSIS:  Large high-grade bladder tumor inside bladder neck   at 12 o'clock position, size greater than 5 cm in diameter.    PROCEDURE:  Transurethral resection of bladder tumor.    ANESTHESIA:  Spinal.    ANESTHESIOLOGIST:  Ravi Haines MD    SURGEON:  Faheem Weinstein MD    BRIEF HISTORY:  This 82-year-old male who suffers from severe COPD and atrial   fibrillation was found to have gross hematuria.  He has developed some   retention.  On cystoscopy, he was found to have an area of bullous edema at   the dome just inside the bladder neck.  He underwent a biopsy of this several   weeks ago and it came back high-grade carcinoma.  No muscle was in the sample,   but he has had continued bleeding when he has been back on his Eliquis.  It   is felt that he needs to have resection of this to try and control the   bleeding.  At this point, he now presents for transurethral resection of this   large bladder tumor.    REPORT OF OPERATION:  Under spinal anesthesia with the patient in the   lithotomy position, the genitalia were prepped and draped in the usual manner.    The 26-Bahamian continuous flow resectoscope was passed into the bladder.    Examination at this point did show the tumor at the dome just inside the   bladder neck.  It did extend probably from about the 11 o'clock position to   the 2 o'clock position inside the bladder neck.  It did extend all the way up   to the dome.  The bladder did have several large diverticula with multiple   small diverticula and marked trabeculation.  He also has a significant lower   abdominal hernia.  At this point, the tumor was identified.  With the scope   turned essentially upside down completely, I then started resecting this just   inside the bladder neck.  Points of bleeding were cauterized during the   procedure.   I did get down to muscle fiber, but with the hand on the abdomen,   I was able to push the bladder down.  This did take some time to resect this.    Towards the end of the procedure, I was getting up closer to the dome and   access to the tumor was becoming more difficult.  Also concern was fear of   perforating the bladder in this area.  Even though bladder did appear   thickened with trabeculation, did feel quite thin and I could easily feel the   scope through the abdominal wall.  At this point, I continued to resect.  In   the areas that became more difficult to resect, I did cauterize this fairly   extensively, but did not feel comfortable doing a resection as I had poor   control of the tissue.  I continued to fulgurate.  Points of bleeding that did   bleed were cauterized extensively.  Fragments of this tumor were removed   using the Ellik  and direct irrigation.  At the end of the procedure,   all fragments were out of the bladder.  I did not see any active bleeding at   this point.  At this point, I felt there was fairly good resection of the   bladder tumor; however, up near the dome, it was very difficult to get to this   area and as I mentioned what I felt would be a good resection; however, I did   get a lot of this out.  At this point, a 22-Jordanian 3-way catheter was   inserted into the bladder.  Position was checked to ensure that had gone into   this diverticulum.  Drainage was clear.  He was started on continuous   irrigation.  He was cleaned up and transferred to the recovery room in stable   condition.       ____________________________________     MD FRANCOISE CALLES / KAYLA    DD:  11/01/2017 20:03:20  DT:  11/01/2017 20:41:17    D#:  [id number]  Job#:  [id number]

## 2017-11-02 NOTE — FLOWSHEET NOTE
11/02/17 0703   Events/Summary/Plan   Non-Invasive Resp Device Site Inspection Completed Intact   General Vent Information   Pulse Oximetry 90 %  (non-rebreather)   BiPAP for Respiratory Failure   #BiPap Initial Day  Yes   IPAP (cmH2O) 12   EPAP (cm H2O) 8   FiO2 100   Alarms Set / Checked Yes   Non-Invasive Temp (C) 31   Respiratory Rate Patient 30   Spontaneous Vt (ml) 608   Spontaneous Ve (LPM) 21.4

## 2017-11-02 NOTE — RESPIRATORY CARE
Bipap/Cpap mask placed.    Can the patient demonstrate removal of mask? Yes    If no, please notify physician.

## 2017-11-02 NOTE — CARE PLAN
Problem: Infection  Goal: Will remain free from infection  Outcome: PROGRESSING AS EXPECTED  Patients individual infection risk assessed. Monitored for signs and symptoms of infection throughout shift. Washed hands or foamed in and out every encounter. Utilized universal precautions. Scrubbed hub before access to IV lines per protocol.     Problem: Respiratory:  Goal: Respiratory status will improve  Outcome: PROGRESSING SLOWER THAN EXPECTED  Patient intubated and central line placed during shift. Will discuss in interdisciplinary rounds SBT's. HOB >30 degrees, chlorhexidine mouth wash, peptic ulcer prophylaxis, and VTE mechanical prophylaxis in use. Patient and family educated on necessity of ET tube and displayed evidence of learning. In restraints.

## 2017-11-02 NOTE — RESPIRATORY CARE
COPD EDUCATION by COPD CLINICAL EDUCATOR  11/2/2017 at 7:14 AM by Kelley Farr     Patient reviewed by COPD education team. Patient does not qualify for COPD program.

## 2017-11-02 NOTE — PROGRESS NOTES
Pt showing signs of increased breathing effort, O2 demand increased, HR increased, BP elevated, Respiratory rate increased.  Rapid response called.  Chest xray, ABG's, EKG and other Labs ordered.   Notified.  New orders received, Pt transfered to [facility] and transported by ICU RN's and respiratory.  Emergency contact notified of updated Pt status.

## 2017-11-02 NOTE — OR NURSING
There is no wound to comment as patient has 3 way catheter and presently no trace of blood in urine.  Pt now twitching his toes voluntarily when asked and with slightest flexion of knee possible on both sides.  Pt denies any pain at this time.

## 2017-11-02 NOTE — CODE DOCUMENTATION
RT NT suctioned patient as there are audible secretions and patient unable to clear with his own cough

## 2017-11-02 NOTE — PROGRESS NOTES
Assumed care of Mr Snyder at 2140.    Pt is A&O x4.  Pain denied  Nausea denied  Tolerating Diet   Positive Urine output (CBI in place)  Negative BM Void   Negative Flatus   SCD's on  Bed in lowest position and locked.  Bed alarm on  Pt resting comfortably now.  Review plan of care with patient  Call light within reach  Hourly rounds in place  All needs met at this time

## 2017-11-02 NOTE — DIETARY
"Nutrition Support Assessment - Male    Meng Snyder is a 82 y.o. male with admitting DX of Bladder Cancer  Pertinent History: Frequent UTI's,  COPD, Atrial Fibrillation, Diverticulitis, Stroke    Height: 170.2 cm (5' 7.01\")  Weight: 68.9 kg (151 lb 14.4 oz)  Weight to Use in Calculations: 69 kg (152 lb 1.9 oz)  Ideal Body Weight: 67.1 kg (148 lb)  Percent Ideal Body Weight: 102.6  Body mass index is 23.78 kg/m².     Pertinent Labs: Glucose 110  Last BM:  (PTA)  Pertinent Medications: Vancomycin, Omeprazole, Propofol @ 12.4 ml/hr (327 kcal) , electrolyte replacement protocol, bowel protocol, insulin  Pertinent Fluids: Lactated ringers @ 125 ml/hr  Surgery / Procedures: Transurethral resection of bladder tumor ()    Estimated Needs:  (BZA=4975 kcal/d; MSJ x 1.1=2217)  Total Calories / day: 9322-0086  (Calories / k-25)  Total Grams Protein / day: 83 - 104  (Grams Protein / k.2 - 1.5)  Total Fluids ml / day: 1725 ml         Assessment / Evaluation:   1) NPO, intubated, TF appropriate for nutrition   2) Propofol providing significant notable kcal provision  3) Order received for Metabolic Cart Study per Dietitian/RT. Study not indicated at this time.   4) Peptide based formula with fish oil to combat inflammation appropriate in the ICU setting     Plan / Recommendation:   1) Start Impact Peptide @ 25 ml/hr.  Advance per protocol to goal rate of 40 ml/hr.  This will provide 1440 kcal/d (1767 with Propofol), 739 ml/d free water and 90 gm protein per day.    2) Once Propofol is discontinued, increase goal rate of Impact Peptide to 45 ml/hr  3) Dietitian/RT to order metabolic cart as needed.   4) Fluids per MD.    RD following.    "

## 2017-11-02 NOTE — PROCEDURES
DATE OF SERVICE:  11/02/2017    PREPROCEDURE DIAGNOSIS:  Acute respiratory failure.    POSTPROCEDURE DIAGNOSIS:  Acute respiratory failure with evidence of   left-sided infiltrate, questionable mass.    ANESTHESIA:  Included fentanyl 100 mcg, etomidate 20 mg, succinylcholine 150   mg, the patient also received some pre-anxiolysis with 0.5 of Versed.    The patient was placed in a supine position.  Time-out was called.  The   patient had agreed to being intubated after being on BiPAP and not tolerating.    DESCRIPTION OF PROCEDURE:  With the patient in a supine position and a towel   under the occiput, a 4.0 Mac blade was used to intubate the patient after the   patient was sedated.  Nurses adequately sedated and paralyzed the patient.  We   did place an oral airway and did a jaw thrust maneuver to ventilate the   patient and he was easy to bag valve ventilate.  Then, 8.0 endotracheal tube   was placed through the cords without difficulty.  The patient had secured at   24 cm at the lip.  Bilateral breath sounds are noted.  There is no evidence of   abdominal air with auscultation.  It was secured in place.  The patient was   placed on full ventilator support with current tidal volume of respiratory   rate is 18, tidal volume is 440, PEEP of 8, and 40% oxygen.  The patient is to   have a stat portable chest x-ray and a repeat arterial blood gas.  Also, CT   scan of the thorax has been ordered based on the findings on x-ray.  I am   concerned that the patient does have a significant new mass versus pneumonia.    This was discussed with the patient prior to the procedure.  The patient   tolerated the procedure well, did receive IV fluid bolus due to mild drop in   blood pressure when propofol was started.       ____________________________________     DO ANMOL Stoner / KAYLA    DD:  11/02/2017 12:08:56  DT:  11/02/2017 12:29:34    D#:  [id number]  Job#:  [id number]

## 2017-11-03 NOTE — CARE PLAN
Problem: Ventilation Defect:  Goal: Ability to achieve and maintain unassisted ventilation or tolerate decreased levels of ventilator support  Outcome: PROGRESSING AS EXPECTED    Intervention: Support and monitor invasive and noninvasive mechanical ventilation  Adult Ventilation Update    Total Vent Days: 2    Patient Lines/Drains/Airways Status    Active Airway     Name: Placement date: Placement time: Site: Days:    Airway Group ET Tube 8.0 11/02/17   1000      2                         Plateau Pressure (Q Shift): 16 (11/02/17 1837)  Static Compliance (ml / cm H2O): 45.6 (11/03/17 0205)            Sputum/Suction   Cough: Non Productive (11/03/17 0205)  Sputum Amount: Small (11/03/17 0205)  Sputum Color: Clear;Tan (11/03/17 0205)  Sputum Consistency: Thin;Thick (11/03/17 0205)    Mobility Group  Activity Performed: Unable to mobilize (11/02/17 2000)  Time Activity Tolerated: 30 min (11/02/17 0800)  Assistance / Tolerance: Assistance of Two or More;Tolerates Well;General Weakness (11/02/17 0800)  Pt Calls for Assistance: No (11/02/17 2000)  Staff Present for Mobilization: CNA (11/02/17 0300)  Assistive Devices: Hand held assist (11/02/17 0800)  Reason Not Mobilized: Bed rest (11/02/17 2000)    Events/Summary/Plan: No changes overnight.

## 2017-11-03 NOTE — CARE PLAN
Problem: Venous Thromboembolism (VTW)/Deep Vein Thrombosis (DVT) Prevention:  Goal: Patient will participate in Venous Thrombosis (VTE)/Deep Vein Thrombosis (DVT)Prevention Measures  Outcome: PROGRESSING AS EXPECTED  Pt wearing SCDs.     Problem: Skin Integrity  Goal: Risk for impaired skin integrity will decrease  Outcome: PROGRESSING AS EXPECTED  Pt repositioned every 2 hours and heels elevated off bed to reduce skin breakdown.

## 2017-11-03 NOTE — PROGRESS NOTES
Urology    Ct results showing large L pulmonary mass with enlarged lymph nodes discussed briefly with family.  [place] grim. Family with discuss.  Will see in am.    Faheem Weinstein MD

## 2017-11-03 NOTE — CARE PLAN
Problem: Fluid Volume:  Goal: Will maintain balanced intake and output  Outcome: PROGRESSING AS EXPECTED  intake and output monitored closely. Documented q 2 hour.

## 2017-11-03 NOTE — PALLIATIVE CARE
"PALLIATIVE CARE FOLLOW-UP:    1620 family meeting with pt, spouse, dtr, Dr. Callahan, ICU RN Valdemar leroy myself.  Dr. Callahan presented overview of pt's current status, including reviewing CT scan, and discussed next steps.  Pt initially adamant about wanting to be extubated -- writing \"NOW\" on pad.  Attempted to calm and allow for family and pt to have conversation and possibility have 12yo grandson Carmine visit to say good bye.  Pt informed that he can have sedation to not have the ET tube distress him as much while his family comes to terms.  Also detailed extubation and transition to comfort care.  Pt and family agreed to DNR.    Spoke with wife and dtr privately -- very tearful and expressing that they want to do what pt wants but wife would like to have the night.  Three of four children of pt and wife , last was two years ago and this was discussed as an additional trauma.  Validated family's thoughts and feelings, and anticipatory grieving.  Discussed grief in teens and provided grief resources.  Family declined spiritual visit.      Wife and dtr returned to bedside where ICU RN was having similar conversation with pt -- pt writing that he wants to go but wants his family to understand.    Plan:  Transition to comfort care when pt/family decide.    Thank you for allowing Palliative Care to support this pt and their family.  Contact [phone] for additional assistance, questions or concerns.  "

## 2017-11-03 NOTE — CONSULTS
"Reason for PC Consult: Advance Care Planning    Consulted by: Syed Callahan DO    Assessment:  General: 11/1 direct admit from PACU s/p large high grade bladder tumor removal (Dr. Weinstein).  Pt was intubated for CT with imaging showing interval increase in size of the left perihilar mass extending to the chest wall anterolaterally and mediastinum medially, narrowing of the left upper lobe bronchi - findings compatible with malignancy. Also ediastinal lymphadenopathy compatible with metastatic disease, small left and trace right pleural effusion, and left upper lobe and left lower lobe consolidation.  PMH: COPD, Afib, CVA with minimal residual, CKD stage II, HTN, RA, DLD, former smoker    Dyspnea: Yes- intubated  Last BM:  (pta)-    Pain: No-    Depression: Unable to determine-      Spiritual:  Is Anglican or spirituality important for coping with this illness?  -    Has a  or spiritual provider visit been requested?      Palliative Performance Scale: 50    Advanced Directive: None- Pt, wife and dtr agree that AD is at home and that wife is DPOA-HC with dtr Eduardo as 1st alternative  DPOA:  -    POLST:No-      Code Status: Full-      Outcome:  Introduced self and role of Palliative Care to pt, wife Kellie and dtr Eduardo.  Pt awake and alert, but intubated.  Assessed family's/pt's understanding of pt's current medical status, overall health picture, and options for future care.  Per family, they spoke with Dr. Weinstein yesterday evening and are awaiting intensivist to cover new imaging with pt.  Meeting is scheduled for 1400 today.  Pt is distressed by intubation -- writes \"labored breathing.\"  Validated the discomfort, discussed the need and weaning, and queried if pt would like additional sedation or anxiolytic -- pt declined.  Pt is a pharmacist.    Pt has an AD which family may bring to 1400 meeting if they go home between.    Discussed with pt ALFONZO Aldrich and PT Star.    All questions answered.  PC " contact information given.     Plan: Meeting with pt, family, Dr. Callahan and myself at 1400.    Thank you for allowing Palliative Care to participate in this patient's care. Please feel free to call [phone] with any questions or concerns.

## 2017-11-03 NOTE — RESPIRATORY CARE
Adult Ventilation Update    Total Vent Days: 2    Patient Lines/Drains/Airways Status    Active Airway     Name: Placement date: Placement time: Site: Days:    Airway Group ET Tube 8.0 11/02/17   1000      1                       Plateau Pressure (Q Shift): 18 (11/03/17 1448)  Static Compliance (ml / cm H2O): 45.2 (11/03/17 1448)    Patient failed trials because of Barriers to Wean: Other (Comments) (11/03/17 0633)    Sputum/Suction   Cough: Non Productive (11/03/17 1448)  Sputum Amount: Small (11/03/17 1044)  Sputum Color: Bloody;Tan (11/03/17 1044)  Sputum Consistency: Thin (11/03/17 1044)    Mobility Group  Activity Performed: Unable to mobilize (11/03/17 0900)  Time Activity Tolerated: 30 min (11/02/17 0800)  Assistance / Tolerance: Assistance of Two or More;Tolerates Well;General Weakness (11/02/17 0800)  Pt Calls for Assistance: No (11/03/17 1000)  Staff Present for Mobilization: CNA (11/02/17 0300)  Assistive Devices: Hand held assist (11/02/17 0800)  Reason Not Mobilized: Bed rest (11/03/17 0900)    Events/Summary/Plan: Vent check, inline meds (11/03/17 1448)

## 2017-11-03 NOTE — CONSULTS
DATE OF SERVICE:  11/02/2017    CHIEF COMPLAINT:  Severe shortness of breath.    HISTORY:    DICTATION ENDS HERE    Please note that I had to respond to an emergency and this dictation was discontinued. Please see medical record for further details.       ____________________________________     DO ANMOL Stoner / KAYLA    DD:  11/02/2017 15:03:53  DT:  11/02/2017 16:47:27    D#:  [id number]  Job#:  [id number]

## 2017-11-03 NOTE — PROGRESS NOTES
Cortrak Placement    Tube Team verified patient name and medical record number prior to tube placement.  Cortrak tube (55 inches, 10 Swiss) placed at 84 cm in left nare.  Per Cortrak picture, tube appears to be in the small bowel.  Nursing Instructions: Awaiting KUB to confirm placement before use for medications or feeding. Once placement confirmed, flush tube with 30 ml of water, and then remove and save stylet, in patient medication drawer.

## 2017-11-03 NOTE — PROGRESS NOTES
"Pharmacy Kinetics 82 y.o. male on vancomycin day # 2  11/3/2017    Currently on Vancomycin 900 mg iv q12hr    Indication for Treatment: PNA     Pertinent history per medical record: Admitted on 2017 for bladder cancer. Pt is s/p trans urethral resection of the bladder for tumor on . Pt had respiratory distress and required intubation. There was evidence of left sided infiltrate. Broad spectrum abx were ordered.     Other antibiotics: cefepime 2 g iv q 12 hrs     Allergies: Review of patient's allergies indicates no known allergies.      List concerns for renal function: age,  BUN/SCr ratio > 20:1    Pertinent cultures to date:   17: TA = Light growth usual upper respiratory shantell    Recent Labs      17   0636  17   0353   WBC  12.6*  7.0   NEUTSPOLYS  61.70  91.30*   BANDSSTABS   --   2.60     Recent Labs      17   0636  17   0353   BUN  19  24*   CREATININE  0.85  0.86   ALBUMIN  3.3  2.3*     Recent Labs      17   1140   VANCOTROUGH  16.0     Intake/Output Summary (Last 24 hours) at 17 1526  Last data filed at 17 1400   Gross per 24 hour   Intake           4822.8 ml   Output             1085 ml   Net           3737.8 ml      Blood pressure 129/83, pulse 84, temperature 37.1 °C (98.7 °F), resp. rate (!) 22, height 1.702 m (5' 7.01\"), weight 70.8 kg (156 lb 1.4 oz), SpO2 99 %. Temp (24hrs), Av.1 °C (98.8 °F), Min:36.7 °C (98 °F), Max:37.4 °C (99.4 °F)      A/P   1. Vancomycin dose change: No  2. Next vancomycin level: was today = 16.0 mcg/ml  3. Goal trough: 16-20 mcg/ml  4. Comments: Vanco trough level is good. Renal function stable. Will continue current dosing for now. Will check another vanco level in 2-3 days to make sure vanco level remains in therapeutic range. TA with UURF, recommend de-escalating abx.    Timothy Gutierrez, PharmD    "

## 2017-11-03 NOTE — CARE PLAN
Problem: Ventilation Defect:  Goal: Ability to achieve and maintain unassisted ventilation or tolerate decreased levels of ventilator support  Outcome: PROGRESSING AS EXPECTED    Intervention: Support and monitor invasive and noninvasive mechanical ventilation  Pt on full, monitored support  Intervention: Monitor ventilator weaning response  No SBT's yet.   Intervention: Perform ventilator associated pneumonia prevention interventions  HOB at or above 30 degrees.  ETT cuff pressure between 20-30 cwp.  Intervention: Manage ventilation therapy by monitoring diagnostic test results  Discussed during morning rounds

## 2017-11-03 NOTE — CARE PLAN
Problem: Ventilation Defect:  Goal: Ability to achieve and maintain unassisted ventilation or tolerate decreased levels of ventilator support    Intervention: Support and monitor invasive and noninvasive mechanical ventilation  Adult Ventilation Update    Total Vent Days: 1  Vent:  x 18, 60% +8    Patient Lines/Drains/Airways Status    Active Airway     Name: Placement date: Placement time: Site: Days:    Airway Group ET Tube 8.0 11/02/17   1000      1                Events/Summary/Plan: Patient stable on vent. Patient to CT today.

## 2017-11-04 NOTE — PALLIATIVE CARE
Palliative Care follow-up  Case discussed with bedside team, including plan for end of life. PC RN visited family at bedside and support provided. Wife Cipriano denied and needs or questions at this time. PC RN provided emotional support and condolences for the difficult situation she's going through. She was appreciative of the visit. The patient appeared very comfortable at the time of visit.    Plan: Comfort measures in place; support as needed    Thank you for allowing Palliative Care to participate in this patient's care. Please feel free to call [phone] with any questions or concerns.

## 2017-11-04 NOTE — PROGRESS NOTES
1031 terminal extubation. Comfort medications given per MD order. Pallative care notified. Family at bedside.

## 2017-11-04 NOTE — CARE PLAN
Problem: Respiratory:  Goal: Respiratory status will improve  Outcome: PROGRESSING AS EXPECTED  Monitor pulmonary status via continuous pulse ox. Provide inline/oral suction PRN    Problem: Psychosocial Needs:  Goal: Level of anxiety will decrease  Outcome: PROGRESSING AS EXPECTED  Assess for s/s on increased anxiety. Provide pt comfort with Propofol.

## 2017-11-04 NOTE — CARE PLAN
Problem: Ventilation Defect:  Goal: Ability to achieve and maintain unassisted ventilation or tolerate decreased levels of ventilator support  Adult Ventilation Update    Total Vent Days: 3  18/440/8/40%  8.0@23  Patient Lines/Drains/Airways Status    Active Airway     Name: Placement date: Placement time: Site: Days:    Airway Group ET Tube 8.0 11/02/17   1000      3              Plateau Pressure (Q Shift): 18 (11/03/17 1448)  Static Compliance (ml / cm H2O): 52.1 (11/04/17 0311)    Sputum/Suction   Cough: Productive (11/04/17 0400)  Sputum Amount: Small (11/04/17 0400)  Sputum Color: White;Yellow (11/04/17 0400)  Sputum Consistency: Thick;Thin (11/04/17 0400)    Mobility Group  Activity Performed: Unable to mobilize (11/03/17 2000)  Time Activity Tolerated: 30 min (11/02/17 0800)  Assistance / Tolerance: Assistance of Two or More;Tolerates Well;General Weakness (11/02/17 0800)  Pt Calls for Assistance: No (11/03/17 2000)  Staff Present for Mobilization: CNA (11/02/17 0300)  Assistive Devices: Hand held assist (11/02/17 0800)  Reason Not Mobilized: Bed rest (11/03/17 0900)    Events/Summary/Plan: Vent check, inline meds (11/03/17 1448)      Problem: Bronchoconstriction:  Goal: Improve in air movement and diminished wheezing  DUO Q4

## 2017-11-04 NOTE — CARE PLAN
Problem: Dyspnea due to end of life  Intervention: Provide comfort measures  Comfort medications provided. Frequent assessment of patient performed. Family at bedside. Quiet environment provided.

## 2017-11-04 NOTE — RESPIRATORY CARE
Extubation    Cuff leak noted yes  Stridor present no     Patient toleration good  Events/Summary/Plan: Pt. extubated to comfort care per MD. Pt. Placed on 2L NC per Dr. Callahan.

## 2017-11-04 NOTE — PROGRESS NOTES
Pulmonary Critical Care Progress Note        Chief Complaint: Shortness of breath    History of Present Illness:  This is an 82-year-old white male with a known   history of atrial fibrillation, previous CVA with minimal deficits, possible   left-sided lung malignancy versus benign mass, hypertension, rheumatoid   arthritis, COPD, chronic kidney disease stage II who presented with   significant concern of neoplasm of the bladder.  The patient has undergone   procedure overnight under spinal anesthesia with Dr. Weinstein, the   urologist.  The patient had been on Eliquis for atrial fibrillation, but   because of the concern of hematuria, he underwent surgery.  The patient had a   moderate amount of blood loss of only 60 mL.  There was a large sessile   bladder tumor at 12 o'clock inside the bladder neck.  The patient went to PACU   and was fairly stable.  Then, there was a rapid response call that developed   today on 11/02/2017 after yesterday's procedure where the patient had   increased respiratory effort, increased O2 demand, elevated blood pressure and   I was notified by the surgeon.  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     When the patient was transferred to the intensive care unit, he was in   moderate distress on high settings of BiPAP, though he was started initially   on 12/6.  The patient then developed severe shortness of breath when he was   tripoding.  We examined his chest x-ray and there was significant concern of   left-sided probable mass that involve most of the left lower lobe and possible   right middle lobe.  Initially, it was felt to be patchy airspace disease in   the right perihilar region, but emphysematous changes on the left mid to lower   lung zone was significantly worse than a previous film.  I suspected a   possible pneumonia or malignancy.  At that point, we discussed the case  at   length with the patient and he agreed to undergo intubation, mechanical   ventilation to lie flat for a CT scan to better delineate the findings.  PE   protocol was performed, which showed no pulmonary embolism.  However, patient   unfortunately has an extensive mass at the level of the aortic arch with   subcarinal lymphadenopathy.  Also, the adenopathy was abutting the esophagus.    On the left, mass was 10.9x8.5 in the left lower lobe and left upper lobe.    Scarring was also seen.  Also, narrowing of the left upper lobe bronchi was   noted.  It was felt that this was likely consistent with metastatic disease or   possible unknown lung primary.  The patient did have both central line placed   and has been receiving IV fluid.  He has not developed significant   hypotension, but did appear slightly dehydrated.  I spoke with the surgeon   regarding the findings and the CT scan appeared concerning.  He was started   with antibiotics with cefepime and vancomycin related to the procedures as well.    This history was obtained by myself yesterday.    ROS:  Respiratory: Ongoing ventilatory needs. Tolerating mechanical ventilation however. Cardiac: negative, GI: negative. Resolving hematuria. No significant signs of intra-abdominal pain    Interval Events:    Patient's CAT scan yesterday was quite consistent with a new significant neoplasm in the left thorax. The patient is been made aware of this and palate of care discussion is being held as the patient has previously had wishes not to be resuscitated and I suspect this is a 2nd lung primary malignancy.    Plans with palliative care been made for discussion regarding this today.    He remains on mechanical ventilation but not requiring significant high-dose sedation for comfort and is only required minimal fentanyl throughout the night.    PFSH:  No change.    Respiratory:  Red Vent Mode: APVCMV, Rate (breaths/min): 18, Vt Target (mL): 440, PEEP/CPAP: 8, FiO2:  40, Static Compliance (ml / cm H2O): 46.9, Control VTE (exp VT): 443  Pulse Oximetry: 98 %  Chest Tube Drains:          Exam: diminished breath sounds moderate. This is predominantly at the left thorax. He is tolerating ventilator support  ImagingCT chest Reviewed. This was reviewed extensively yesterday and then with family at bedside today. Chest x-ray today in my opinion shows ongoing left mid lung mass with left-sided infiltrate and mild right basilar infiltrate. There is slight cardiac silhouette shift. Right IJ catheter is in good position, ET tube is in good position.  Recent Labs      11/02/17   1221  11/03/17   0451  11/03/17   0533   ISTATAPH  7.469  7.386*  7.394*   ISTATAPCO2  43.3*  49.6*  47.0*   ISTATAPO2  79  39*  36*   ISTATATCO2  33  31  30   JANGXPQ2CEL  96  72*  68*   ISTATARTHCO3  31.5*  29.7*  28.7*   ISTATARTBE  7*  4*  3   ISTATTEMP  37.0 C  101.1  98.0   ISTATFIO2  60  40  40   ISTATSPEC  Arterial  Arterial  Arterial   ISTATAPHTC  7.469  7.365*  7.399*   [id number]  79  43*  35*       HemoDynamics:  Pulse: 85, Heart Rate (Monitored): 79  NIBP: (!) 98/71       Exam: Irregular rhythm but controlled rate at this time.  Imaging: Available data reviewed       CONCLUSIONS  Normal left ventricular systolic function.  Left ventricular ejection fraction is visually estimated to be 60%.  Mild concentric left ventricular hypertrophy.  Aortic sclerosis without stenosis.  Right heart pressures are consistent with moderately severe pulmonary   hypertension of 60 mmHg.  Severely dilated left atrium.  Mildly dilated right ventricle.  Normal right ventricular systolic function.    Neuro:  GCS Total Pranay Coma Score: 10       Exam: no focal deficits noted  Imaging: None - Reviewed    Fluids:  Intake/Output       11/01/17 0700 - 11/02/17 0659 11/02/17 0700 - 11/03/17 0659 11/03/17 0700 - 11/04/17 0659      5076-9510 1610-9568 Total 0557-1772 6686-4412 Total 9132-4637 6545-8108 Total       Intake    P.O.  --   240 240  60  -- 60  --  -- --    P.O. -- 240 240 60 -- 60 -- -- --    I.V.  --  2164 2164  1249  2099.4 3348.4  1783.6  260.2 2043.8    Crystalloid Intake -- 1200 1200 -- -- -- -- -- --    Propofol Volume -- -- -- -- 139.4 139.4 24.6 8.2 32.8    IV Piggyback Volume (IV Piggyback) -- -- -- -- 450 450 600 -- 600    IV Volume (IV Lactated Ringers) --  1500 2749 9148 674 3362    IV Volume (Fentanyl) -- -- -- -- 10 10 9 2 11    Other  --  600 600  --  90 90  30  -- 30    Medications (P.O./ Enteral Liquids) -- -- -- -- 90 90 30 -- 30    CBI Volume (CBI Intake) -- 600 600 -- -- -- -- -- --    Enteral  --  -- --  --  320 320  310  50 360    Enteral Volume -- -- -- -- 200 200 250 50 300    Free Water / Tube Flush -- -- -- -- 120 120 60 -- 60    Total Intake -- 3004 3004 1309 2509.4 3818.4 2123.6 310.2 2433.8       Output    Urine  --  1775 1775  2250  500 2750  460  200 660    Indwelling Cathether --  500 1600 460 200 660    CBI Output (CBI Output) -- 1375 1375 1150 -- 1150 -- -- --    Stool  --  -- --  --  -- --  --  -- --    Number of Times Stooled -- 0 x 0 x -- -- -- -- -- --    Blood  --  20 20  --  -- --  --  -- --    Est. Blood Loss (mL) -- 20 20 -- -- -- -- -- --    Total Output -- 1795 1795 2250 500 2750 460 200 660       Net I/O     -- 1209 1209 -941 2009.4 1068.4 1663.6 110.2 1773.8        Weight: 70.8 kg (156 lb 1.4 oz)  Recent Labs      11/02/17   0636  11/03/17   0353   SODIUM  138  136   POTASSIUM  4.2  3.5*   CHLORIDE  100  103   CO2  30  27   BUN  19  24*   CREATININE  0.85  0.86   MAGNESIUM   --   1.3*   PHOSPHORUS   --   2.4*   CALCIUM  10.0  9.0       GI/Nutrition:  Exam: abdomen is soft and non-tender  Imaging: None - Reviewed  Feeding tube is in position with tube feeds being given.  Liver Function  Recent Labs      11/02/17   0636  11/03/17   0353   ALTSGPT  8  6   ASTSGOT  19  14   ALKPHOSPHAT  55  39   TBILIRUBIN  1.4  1.2   GLUCOSE  110*  108*       Heme:  Recent Labs       17   0636  17   1155  17   0353   RBC  3.73*   --   2.93*   HEMOGLOBIN  12.3*   --   9.7*   HEMATOCRIT  38.0*   --   29.6*   PLATELETCT  222   --   135*   PROTHROMBTM   --   15.5*   --    APTT   --   30.9  34.9   INR   --   1.26*   --        Infectious Disease:  Temp  Av.1 °C (98.8 °F)  Min: 36.7 °C (98 °F)  Max: 37.4 °C (99.4 °F)  Micro: cultures reviewed. Gram-positive cocci are noted in sputum culture.  Recent Labs      17   0636  17   WBC  12.6*  7.0   NEUTSPOLYS  61.70  91.30*   LYMPHOCYTES  26.30  4.30*   MONOCYTES  9.00  0.90   EOSINOPHILS  1.70  0.90   BASOPHILS  0.50  0.00   ASTSGOT  19  14   ALTSGPT  8  6   ALKPHOSPHAT  55  39   TBILIRUBIN  1.4  1.2     Current Facility-Administered Medications   Medication Dose Frequency Provider Last Rate Last Dose   • lidocaine (XYLOCAINE) 1%  injection  1-2 mL Q30 MIN PRN Jorge Callahan D.O.       • MD ALERT...Adult ICU Electrolyte Replacement per Pharmacy Protocol   pharmacy to dose Jorge Callahan D.O.       • omeprazole 2 mg/mL in sodium bicarbonate (PRILOSEC) oral susp 40 mg  40 mg DAILY JUAN CARLOS GregoryO.   40 mg at 17 0840   • insulin lispro (HUMALOG) injection 2-9 Units  2-9 Units Q6HRS Jorge Callahan D.O.   Stopped at 17 1200   • Respiratory Care per Protocol   Continuous RT Jorge Callahan D.O.       • senna-docusate (PERICOLACE or SENOKOT S) 8.6-50 MG per tablet 2 Tab  2 Tab BID JUAN CARLOS GregoryO.   2 Tab at 17    And   • polyethylene glycol/lytes (MIRALAX) PACKET 1 Packet  1 Packet QDAY PRN Jorge Callahan D.O.        And   • magnesium hydroxide (MILK OF MAGNESIA) suspension 30 mL  30 mL QDAY PRN Jorge Callahan D.O.        And   • bisacodyl (DULCOLAX) suppository 10 mg  10 mg QDAY PRN Jorge Callahan D.O.       • chlorhexidine (PERIDEX) 0.12 % solution 15 mL  15 mL BID Jorge Callahan D.O.   15 mL at 17   • lidocaine (XYLOCAINE) 1%  injection  1-2 mL Q30 MIN PRN  Jorge Callahan D.O.       • fentaNYL (SUBLIMAZE) 50 mcg/mL in 50mL   Continuous KIRA Gregory.ORamy   Stopped at 11/03/17 0400   • ipratropium-albuterol (DUONEB) nebulizer solution 3 mL  3 mL Q4HRS (RT) JUAN CARLOS GregoryO.   3 mL at 11/03/17 1947   • glucose 4 g chewable tablet 16 g  16 g Q15 MIN PRN Jorge Callahan D.O.        And   • dextrose 50% (D50W) injection 25 mL  25 mL Q15 MIN PRN JUAN CARLOS GregoryO.       • fentaNYL (SUBLIMAZE) injection 25 mcg  25 mcg Q HOUR PRN JUAN CARLOS GregoryO.        Or   • fentaNYL (SUBLIMAZE) injection 50 mcg  50 mcg Q HOUR PRN JUAN CARLOS GregoryO.        Or   • fentaNYL (SUBLIMAZE) injection 100 mcg  100 mcg Q HOUR PRN KIRA Gregory.O.       • cefepime (MAXIPIME) 2 g in  mL IVPB  2 g Q12HRS JUAN CARLOS GregoryO. 200 mL/hr at 11/03/17 2024 2 g at 11/03/17 2024   • MD ALERT... vancomycin per pharmacy protocol   pharmacy to dose Jorge Callahan D.O.       • propofol (DIPRIVAN) injection  0-80 mcg/kg/min Continuous Beatriz MeadeD 4.1 mL/hr at 11/03/17 1156 10 mcg/kg/min at 11/03/17 1156   • vancomycin 900 mg in  mL IVPB  13 mg/kg Q12HR Timothy Gutierrez PharmD   Stopped at 11/03/17 1257   • Pharmacy Consult Request ...Pain Management Review 1 Each  1 Each PRN Faheem Weinstein M.D.       • lactated ringers infusion   Continuous Jorge Callahan D.O. 125 mL/hr at 11/03/17 2026     • acetaminophen (TYLENOL) tablet 650 mg  650 mg Q6HRS Faheem Weinstein M.D.   Stopped at 11/03/17 1200   • oxycodone immediate-release (ROXICODONE) tablet 2.5 mg  2.5 mg Q3HRS PRMAGDALENA Weinstein M.D.       • oxycodone immediate-release (ROXICODONE) tablet 5 mg  5 mg Q3HRS PRMAGDALENA Weinstein M.D.       • HYDROmorphone (DILAUDID) injection 0.25 mg  0.25 mg Q3HRS PRMAGDALENA Weinstein M.D.   Stopped at 11/02/17 1104   • ondansetron (ZOFRAN) syringe/vial injection 4 mg  4 mg Q6HRS PRMAGDALENA Weinstein M.D.       • opium-belladonna (B&O  SUPPRETTES) suppository 30 mg  30 mg Q4HRS PRN Faheem Weinstein M.D.       • albuterol inhaler 2 Puff  2 Puff Q4HRS PRN Faheem Weinstein M.D.       • albuterol (PROVENTIL) 2.5mg/0.5ml nebulizer solution 2.5 mg  2.5 mg Q4H PRN (RT) Faheem Weinstein M.D.       • methotrexate tablet 5 mg  5 mg Q7 DAYS Faheem Weinstein M.D.       • Respiratory Care per Protocol   Continuous RT Faheem Weinstein M.D.         Last reviewed on 11/1/2017  6:19 PM by Corine Rodney R.N.    Quality  Measures:  Labs reviewed, Medications reviewed and Radiology images reviewed  Singletary catheter: Critically Ill - Requiring Accurate Measurement of Urinary Output  Central line in place: Sepsis      DVT prophylaxis - mechanical: SCDs  Ulcer prophylaxis: Yes  Antibiotics: Treating active infection/contamination beyond 24 hours perioperative coverage      Assessment and plan:    1.  Acute hypoxemic respiratory failure.  The patient has been intubated and   mechanically ventilated for a CT scan to better delineate this lesion.  There   appears to be large malignancy of at least 10.6 cm in greatest diameter of the   left lung.  There is also as mentioned right hilar lymph node adenopathy and   axillary lymph node adenopathy.  Left lower lobe and left upper lobe   consolidations are noted as well.  There appears to be a small left pleural   effusion in my opinion as well.   there is probably right basilar atelectasis and/or infiltrate as well. Positive sputum for gram-positive cocci. Patient remains on vancomycin and cefepime.    Plan for palliative care discussion regarding this probable 2nd primary malignancy. 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 Patient has made known that he does not want long-term intubation and discussion held at 4:00 for at least one half an hour regarding decisions for do not resuscitate status and timing for extubation is  planned.    2. Status post bladder neck tumor removal with spinal anesthesia. This is by Dr. Weinstein. No significant hematuria is ongoing.    3. COPD. Patient has evidence of bullous emphysematous changes while on CT scan. Patient remains on mechanical ventilation at this time until decisions are made regarding possible withdrawal of care comfort measures.    4. Rheumatoid arthritis.    5. Atrial fibrillation previously on August. This is being held at this time.    Comment: Long discussion was held with palliative care and with patient at bedside. Images were shown to the patient after propofol was held. Patient is in agreement to do not resuscitate and possible withdrawal of the ventilator. The family is deciding at this time the best course of action since he does not want further aggressive management. We explained that because of his severe respiratory distress with tripoding and the need for BiPAP prior to intubation and mechanical ventilation that he may die and we would want to provide comfort measures. We have given them time to consider this decision. This conversation was a separate discussion at approximately 4 PM.    Discussed patient condition and risk of morbidity and/or mortality with multi disciplinary wound team, family, palliative care team   The patient remains critically ill.  Critical care time = 1.5 hours minutes in directly providing and coordinating critical care and extensive data review.  No time overlap and excludes procedures.    Jorge Callahan D.O.

## 2017-11-04 NOTE — PROGRESS NOTES
Urology    PT and family seen and discussed care.  Pt wants ET tube removed.  Talked to family and they want to follow his desires.  Discussed and reiterated notes of palliative care team.  Support to family given.    Plans per family and staff.    Faheem Weinstein MD

## 2017-11-05 NOTE — PROGRESS NOTES
Critical care attending note:    Patient's family have agreed early this morning to pursue comfort care measures. The patient has been extubated. I have evaluated the patient after extubation which reveals that he is having some moderate respiratory distress and we have began Ativan pushes with morphine. The patient received morphine infusion as well. He appears comfortable. I spoke with the family. Patient appears appropriate for transfer since he is comfort care measures only. We will sign the patient over to hospitalist team.    Jorge Callahan D.O.

## 2017-11-06 NOTE — DOCUMENTATION QUERY
DOCUMENTATION QUERY     DR. BOWMAN, Please review this Documentation Query and reply with an addendum. You co-signed only, which does not address the needed clarification from you to be able to code and complete this account.        To better represent the severity of illness of your patient, please review the following information and exercise your independent professional judgment in responding to this query.      Biopsy and fulguration of bladder tumor is documented in the Operative report. Based upon the clinical findings, risk factors, and treatment, Please document the size of the bladder tumor biopsied and fulgurated. This information is needed to accurately code the procedure.     Minor bladder tumor (less than 0.5 cm)  Small bladder tumor(0.5 to 2.0 cm)  Medium bladder tumor (2.0 to 5.0 cm)  Large bladder tumor           The medical record reflects the following:   Clinical Findings Bladder tumor   Treatment Biopsy/fulguration   Risk Factors     Location within medical record Operative report      Thank you,   Angelina Leonard     Pt 's bladder tumor size was greater than 5 cm diameter but a flat, sessile tumor.     Faheem Bowman MD

## 2017-11-06 NOTE — ADDENDUM NOTE
Encounter addended by: Isabela Fagan on: 11/6/2017 12:54 PM<BR>    Actions taken: Sign clinical note

## 2017-11-21 NOTE — DISCHARGE SUMMARY
DISCHARGE TIME:  35 minutes.    DATE OF ADMISSION:  2017.    DATE OF DEATH:  The patient  on 2017.    ADMISSION DIAGNOSES:  High Grade Urethral Carcinoma.      SECONDARY DIAGNOSES:  1.  Large left perihilar mass with metastatic changes including mediastinal   adenopathy.  2.  Chronic obstructive pulmonary disease.  3.  Atherosclerotic heart disease.  4.  Atrial fibrillation.  5.  History of cerebrovascular accident with minimal deficits.  6.  Hypertension.  7.  Rheumatoid arthritis.  8.  Stage II chronic kidney disease.  9.  Benign prostatic hypertrophy.  10.  Dyslipidemia.    CONSULTANTS:  Included intensive care unit service with myself, Dr. Callahan, and   the palliative care service, and urology surgeon was Dr. Faheem Weinstein.    PROCEDURES:  Included:  1.  Transurethral resection of the bladder with spinal anesthesia performed on   2017.    2. Intubation with mechanical   Ventilation.  3.  Right IJ central line placement.    HOSPITAL COURSE:  This is an 82-year-old white male with multiple medical   problems as discussed above that had outpatient biopsy by Dr. Weinstein due   to hematuria that was concerning for probable high-grade urethral malignancy.    The patient underwent on 2017, a transurethral resection of the bladder   with a large tumor noted.  The patient had spinal anesthesia and did not have   significant blood loss, of only 60 mL.    Then, in the overnight hours, the patient developed after having a three-way   catheter placed, the urine that was irrigated to clear, and later that evening   respiratory therapist was called with increased respiratory effort, increased   O2 demand with heart rate increased.  Rapid response nurse was called.    Patient was transferred to [facility].    This is early morning on the day after surgery. Patient was placed on BiPAP and CPAP   mask and was not tolerating well.  The patient was in extremis and tripoding,   and after discussion with  the patient, he agreed to intubation, mechanical   ventilation and prior to x-ray that was concerning for significant left-sided   lung malignancy.  This was the morning he was transferred to the ICU .  We therefore, proceeded with intubation and mechanical   Ventilation after patient consent.    He underwent a right IJ central catheter placement at that point   and CT scan of the thorax was performed.  This was on 11/02/2017.      The CT scan   patient's examination showed significant increase in the left perihilar mass   that was noted anterolaterally in the mediastinum with narrowing of the left   upper lobe bronchi.  That was consistent with malignancy.  Mediastinal   adenopathy also noted, as well as a trace effusion and left lower lobe and   left upper lobe consolidative changes.  There was also a nodule within the   right lower pulmonary nodule as well.      The patient did undergo echocardiogram   that revealed no significant abnormalities of the left side, but he did have   aortic sclerosis without stenosis, right heart pressures consistent with   mildly elevated pulmonary hypertension and dilated right ventricle,   moderately.  The patient had typical ICU management at that time and patient   did undergo tracheal aspirate that was showing only few gram-positive cocci.    The results of the examination was discussed with the urologist and we both   felt that this was a very ominous sign of malignancy.  A possible second   malignancy was a consideration.    The patient's family then met with the urologist and then eventually   palliative care and myself met with them and though the patient was on   sedation he was capable of informed decision making, and able to respond back   With nodding and penning, and was able contribute to making decisions regarding   his care.      He was placed on typical hospital-acquired pneumonia coverage, as   well as bronchodilators during the time prior to this discussion and  he did   require some vasopressor support during the time on mechanical ventilation.    By 11/02/2017, was felt that the outlook was grim in regards to him undergoing   further treatment, which would include both treatment for his urologic   malignancy and also the chest malignancy.      Therefore, by 11/03/2017, the   family did want to have the patient removed, as well as the patient in   agreement.  We discussed that we would make him comfortable and without   significant respiratory distress after extubation.  The patient was then   extubated on 11/04/2017, at 10:30 in the a.m.  Terminal extubation with   comfort care orders were written.  The patient did continue to obtain comfort   measure care and by the evening of the 11/04/2017, the patient was then   declared dead.  The attending physician was notified.      The patient final   diagnosis is death.         ____________________________________     DO ANMOL Stoner / KAYLA    DD:  11/20/2017 21:31:00  DT:  11/20/2017 22:54:48    D#:  [id number]  Job#:  [id number]

## 2017-12-08 NOTE — DOCUMENTATION QUERY
"DOCUMENTATION QUERY    PROVIDERS: Please select “Cosign w/ note”to reply to query.    To better represent the severity of illness of your patient, please review the following information and exercise your independent professional judgment in responding to this query.     Patient presented on 11/01 for TURB for bladder cancer. On 11/02 patient was admitted to inpatient status secondary to acute hypoxic respiratory failure and possible pneumonia. Consult documents possible pneumonia or malignancy and \"Left lower lobe and left upper lobe consolidations are noted as well.\"    Imaging on 11/02 documents, \"Large area of pneumonitis throughout the left lower lobe. Streaky pneumonitis in the right lower lobe.\"    Repeat imaging on 11/02 documents, \"Extensive airspace opacities in the left mid and lower lung zone with improved aeration of the left costophrenic angle compared to prior. Findings likely represent pneumonia.\"    Death Summary documents the patient was \"placed on typical hospital-acquired pneumonia coverage\". Cefepime and Vancomycin were administered until the date of patient's passing.    Please clarify whether pneumonia was    1. Ruled in  2. Ruled out  3. Other explanation of clinical presentation (please document)  4. Unable to determine            The medical record reflects the following:   Clinical Findings  bladder cancer   Pneumonia vs malignancy   Treatment  Cefepime and Vancomycin   Risk Factors     Location within medical record  Progress Notes, Discharge Summary, Radiology Results and Consult Notes     Thank you,   Aurora العلي          "

## 2018-01-01 NOTE — PROGRESS NOTES
Chief Complaint   Patient presents with   • Shortness of Breath   • Other     has attended pulmonary rehab       HPI:  Meng Snyder is a 81 y.o. year old male here today for follow-up on his severe COPD, chronic respiratory failure and lung mass. He was last seen in the office in January 2017 with Dr. Sebas Johnson. PFT's February 2017 indicated an FEV1 of 1.08 L, 48% predicted with an FEV1/FVC ratio of 51 with a DLCO of 38% predicted. Prior PFT's 2014 indicated an FEV1 of 45% predicted with a diffusion capacity test of 64% predicted. He also has a large left lung mass over 8 cm. It has been observed since 2005 and is very slowly increasing in size. He said several biopsies which have been nondiagnostic. He is currently on chronic anticoagulation for atrial fibrillation. He had increased lower extremity swelling at his last office visit. He has since been seen by his Cardiologist, Dr. Dumont. He was taking off his Amlodipine and resumed on his Dyazide. He is on oxygen 24 hours a day and he uses Advair and Spiriva inhalers. He does have a home nebulizer with Albuterol. He uses this 3 times a day. He also has an Albuterol HFA inhaler on hand. He is on methotrexate for rheumatoid arthritis. He quit smoking back in 2004. He was referred to Pulmonary rehab at his last office visit. He is attending pulmonary rehab currently.   He states he has increased dyspnea for the past 5 days. He has had a sore throat the past few days. He states he has had an increased cough. He states his cough has been productive. He states his mucous is thick and yellow in color. He started OTC Mucinex which has helped some. He notes an occasional wheeze, but does not feel this is worse. He has had increased lower extremity swelling as well. Worse over the past week. He also started himself on a Medrol dose pack 2 days ago. He feels this has helped. He is on Septra which was started last Friday for a UTI. He denies fevers or chills. His  "oxygen saturation today in the office is 89% on 4 LPM.       Past Medical History   Diagnosis Date   • EMPHYSEMA 5/8/2012   • MEDICAL HOME 10/24/12   • BPH (benign prostatic hyperplasia) 6/3/2013   • Hiatus hernia syndrome    • Unspecified cataract      bilateral IOL   • Hepatitis A      pt thinks  it was A from  \"food \"   • Dental disorder      upper/lower   • Breath shortness      1-3 l/m 24/7   • Anemia    • RA    • Rheumatoid arthritis, adult (CMS-HCC)    • Anesthesia      pt said \"can't be on vent due to coughing after\"   • Hemorrhagic disorder (CMS-HCC) 10/12/15     on blood thinner   • Arrhythmia      hx a fib       Past Surgical History   Procedure Laterality Date   • Hip hemiarthroplasty  12/28/2014     Performed by Vincent Krishna M.D. at SURGERY [facility]S   • Other abdominal surgery  2004      colon resection colostomy with colostomy take down   • Recovery  2/13/2015     Performed by -Recovery Surgery at SURGERY SAME DAY [facility] ORS   • Bronchoscopy-endo N/A 10/13/2015     Procedure: BRONCHOSCOPY-ENDO;  Surgeon: James WHITLEY M.D.;  Location: [facility];  Service:    • Bronchoscopy-endo N/A 3/3/2016     Procedure: FIBEROPTIC BRONCHOSCOPY-ENDO W/BRONCHOALVEOLAR LAVAGE;  Surgeon: Ric Dailey M.D.;  Location: [facility];  Service:        Family History   Problem Relation Age of Onset   • Hypertension Father    • Stroke         Social History     Social History   • Marital Status:      Spouse Name: N/A   • Number of Children: N/A   • Years of Education: N/A     Occupational History   • Not on file.     Social History Main Topics   • Smoking status: Former Smoker -- 2.00 packs/day for 50 years     Types: Cigarettes     Quit date: 01/01/2004   • Smokeless tobacco: Former User     Types: Chew     Quit date: 03/14/2002   • Alcohol Use: No   • Drug Use: No   • Sexual Activity:     Partners: Female     Other Topics Concern   • Not on file     Social History " Narrative         ROS:  Constitutional: Denies fevers, chills, sweats, fatigue, weight loss  Eyes: Denies vision loss, pain, drainage, double vision. Wears glasses   Ears/Nose/Mouth/Throat: Denies rhinitis, nasal congestion, ear ache, difficulty hearing, sore throat, persistent hoarseness, decayed teeth/toothache  Cardiovascular: Denies chest pain, tightness, palpitations, fainting, difficulty breathing when laying down. Positive lower extremity edema   Respiratory: See HPI   GI: Denies heartburn, difficulty swallowing, nausea, vomiting, abdominal pain, diarrhea, constipation  : Denies frequent urination, painful urination  Integumentary: Denies rashes, lumps or color changes  MSK: Denies painful joints, sore muscles, and back pain.   Neurological: Denies frequent headaches, dizziness, weakness        Current Outpatient Prescriptions on File Prior to Visit   Medication Sig Dispense Refill   • sulfamethoxazole-trimethoprim (BACTRIM DS) 800-160 MG tablet Take 1 Tab by mouth 2 times a day for 10 days. 20 Tab 0   • folic acid (FOLVITE) 1 MG Tab Take 2 Tabs by mouth every evening. 180 Tab 3   • triamterene-hctz (MAXZIDE-25/DYAZIDE) 37.5-25 MG Tab Take 1 Tab by mouth every day. 30 Tab 11   • azithromycin (ZITHROMAX) 250 MG Tab Take as directed (Patient not taking: Reported on 4/21/2017) 6 Tab 0   • apixaban (ELIQUIS) 5mg Tab Take 1 Tab by mouth 2 Times a Day. 180 Tab 1   • predniSONE (DELTASONE) 5 MG Tab Take 1 Tab by mouth every day. 90 Tab 3   • fluticasone-salmeterol (ADVAIR DISKUS) 500-50 MCG/DOSE AEROSOL POWDER, BREATH ACTIVATED Inhale 1 Puff by mouth every 12 hours. 3 Inhaler 3   • methotrexate 2.5 MG Tab Take 2 Tabs by mouth every 7 days. Unknown what day he takes this. 10 Tab 6   • albuterol (PROVENTIL) 2.5mg/0.5ml Nebu Soln 0.5 mL by Nebulization route 2 Times a Day. 375 mL 5   • dutasteride (AVODART) 0.5 MG capsule Take 1 Cap by mouth every evening. 90 Cap 3   • terazosin (HYTRIN) 5 MG Cap Take 1 Cap by mouth  "every day. 30 Cap 11   • methylPREDNISolone (MEDROL) 4 MG Tab Take 1 Tab by mouth every day. (Patient not taking: Reported on 4/21/2017) 90 Tab 3   • tiotropium (SPIRIVA) 18 MCG Cap Inhale 1 Cap by mouth every day. 90 Cap 3   • albuterol 108 (90 BASE) MCG/ACT Aero Soln inhalation aerosol Inhale 2 Puffs by mouth every 6 hours as needed. Indications: Asthma 8.5 g 5   • atorvastatin (LIPITOR) 40 MG Tab Take 1 Tab by mouth every bedtime. (Patient not taking: Reported on 12/20/2016) 30 Tab 6   • Multiple Vitamins-Minerals (MULTIVITAMIN PO) Take 1 Tab by mouth every evening.     • vitamin D (CHOLECALCIFEROL) 1000 UNIT TABS Take 2,000 Units by mouth every evening.       No current facility-administered medications on file prior to visit.     Review of patient's allergies indicates no known allergies.    Blood pressure 122/70, pulse 112, resp. rate 18, height 1.727 m (5' 7.99\"), weight 77.111 kg (170 lb), SpO2 89 %.  PE:   Appearance: Well developed, well nourished, no acute distress  Eyes: PERRL, EOM intact, sclera white, conjunctiva moist  Ears: no lesions or deformities  Hearing: grossly intact  Nose: no lesions or deformities  Oropharynx: tongue normal, posterior pharynx without erythema or exudate  Neck: supple, trachea midline, no masses   Respiratory effort: no intercostal retractions or use of accessory muscles  Lung auscultation: diminished throughout with rare scattered expiratory wheezing  Heart auscultation: no murmur rub or gallop  Extremities: no cyanosis, 2 + edema worse on the right.   Abdomen: soft ,non tender, no masses  Gait and Station: normal  Digits and nails: no clubbing, cyanosis, petechiae or nodes.  Cranial nerves: grossly intact  Skin: no rashes, lesions or ulcers noted  Orientation: Oriented to time, person and place  Mood and affect: mood and affect appropriate, normal interaction with examiner  Judgement: Intact          Assessment:  1. COPD with exacerbation (CMS-[facility])  predniSONE (DELTASONE) " 10 MG Tab    amoxicillin-clavulanate (AUGMENTIN) 875-125 MG Tab   2. Chronic respiratory failure with hypoxia (CMS-HCC)     3. Lung mass     4. Edema of both legs  BASIC METABOLIC PANEL    B TYPE NATRIURETIC    furosemide (LASIX) 20 MG Tab         Plan:    1) Chest xray today in the office indicates; Left lung mass again identified which appears slightly smaller than on the prior exam. Probable small amount of pleural fluid versus pleural thickening noted in the left major fissure. Minimal blunting of left costophrenic angle is again noted. No new infiltrates or consolidations are identified.  2) Complete current course of Medrol. He is on Septra for a UTI. He will complete this. I did provided him emergency scripts for Augmentin and a Prednisone taper to have on hand. He does not feel Azithromycin works well for him. He is encouraged to use a Probiotic when on the antibiotic.  3) Continue Mucinex OTC. Continue routine nebulizer treatments.  4) Continue Advair and Spiriva inhalers.  5) Continue 02 3-4 LPM 24/7.  6) Add Lasix 20 mg daily for the next 3 mornings. He is encouraged to contact his Cardiologist for further diuresis management as discussed at their last appointment. I will order BNP and BMP now.   7) He has a large left lung mass with unclear etiology. Several prior non diagnostic biopsies. He is not felt to be a surgical candidate. Continue to monitor with radiographic follow up. Today's chest xray shows slightly smaller.   8) Continue with Pulmonary rehab.   9) He is up to date on Pneumovax 23 and Prevnar 13 vaccines. Recommend an updated Influenza vaccine in the Fall.   10) Follow up with PCP for ongoing UTI management.   11) 6 week follow up, sooner if needed.    yes

## 2019-03-18 NOTE — OR NURSING
In st. 2-pt. Able to MIRANDA and with good bodily sensation after spinal anesthesia.  
Oxygen supplier, Preferred.  
157.48

## 2019-03-27 NOTE — TELEPHONE ENCOUNTER
Called patient and informed him of urine culture results. He states he is not feeling any better, but is not feeling any worse. Denies fevers, chills, or back pain. Will send in rx for Ceftin 250 mg BID x 10 days. Advised to stop taking Keflex today, finish Ceftin as prescribed. RTC if symptoms have not resolved after taking Ceftin. Advised to follow up with his PCP for evaluation of kidney function in 1-2 weeks.    
Marissa lewis

## 2020-01-24 NOTE — PROGRESS NOTES
This note is specifically for wellness visit performed today.     WELLNESS EXAM      Patient ID: Jonhny Sneed is a 65 y.o. male.  has a past medical history of Atrial fibrillation, Diverticulosis, Hyperlipidemia, Hypertension, and Ruptured eardrum.     Chief Complaint:  Encounter for wellness exam    Well Adult Physical: Patient here for a comprehensive physical exam.The patient reports NO problems. See other note for chronic conditions.  Do you take any herbs or supplements that were not prescribed by a doctor? no   Are you taking calcium supplements? no   Are you taking aspirin daily?  Yes   History:    UROLOGIST:  Dr. Seay  Date last prostate exam:  Requesting records  Date last PSA:  Requesting records  No results found for: PSA   No history of STDs    Health Maintenance Topics with due status: Not Due       Topic Last Completion Date    PROSTATE-SPECIFIC ANTIGEN 06/27/2019    Lipid Panel 01/17/2020        ==============================================  History reviewed.     Health Maintenance Due   Topic Date Due    TETANUS VACCINE  06/02/1974    Colonoscopy  06/02/2006       Past Medical History:  Past Medical History:   Diagnosis Date    Atrial fibrillation     Diverticulosis     Hyperlipidemia     Hypertension     Ruptured eardrum      Past Surgical History:   Procedure Laterality Date    COLONOSCOPY  2015    [facility]    CORONARY ARTERY BYPASS GRAFT  09/2014    3 Vessel [facility]    INNER EAR SURGERY Left 1980    NASAL SEPTUM SURGERY  03/2019    Dr Davis Obrien    PROSTATE BIOPSY  04/2011     Review of patient's allergies indicates:  No Known Allergies  Current Outpatient Medications on File Prior to Visit   Medication Sig Dispense Refill    amiodarone (PACERONE) 200 MG Tab       aspirin 81 MG Chew       digoxin (LANOXIN) 250 mcg tablet       ergocalciferol (ERGOCALCIFEROL) 50,000 unit Cap Take 1 capsule (50,000 Units total) by mouth every 7 days. 4 capsule 11     Urology    Pt had last Eliquis Sunday night and it is felt by anesthesia that this is inadequate time to clear drug.  Patient not willing to undergo general anesthesia due to severe COPD.  Surgery cancelled.  Will reschedule at later date.    Faheem Weinstein MD   ezetimibe (ZETIA) 10 mg tablet       metoprolol tartrate (LOPRESSOR) 25 MG tablet       rivaroxaban (XARELTO) 20 mg Tab Take 1 tablet (20 mg total) by mouth once daily. 30 tablet 11    rosuvastatin (CRESTOR) 40 MG Tab       sildenafil (REVATIO) 20 mg Tab TAKE 1 TABET BY MOUTH DAILY AS NEEDED AS DIRECTED      vitamin D (VITAMIN D3) 1000 units Tab Take 2,000 Units by mouth once daily.       No current facility-administered medications on file prior to visit.      Social History     Socioeconomic History    Marital status:      Spouse name: Not on file    Number of children: Not on file    Years of education: Not on file    Highest education level: Not on file   Occupational History    Not on file   Social Needs    Financial resource strain: Not on file    Food insecurity:     Worry: Not on file     Inability: Not on file    Transportation needs:     Medical: Not on file     Non-medical: Not on file   Tobacco Use    Smoking status: Never Smoker    Smokeless tobacco: Never Used   Substance and Sexual Activity    Alcohol use: Yes     Alcohol/week: 2.0 standard drinks     Types: 2 Glasses of wine per week     Frequency: Never    Drug use: Never    Sexual activity: Not Currently     Partners: Female     Birth control/protection: Abstinence, Coitus interruptus, Condom   Lifestyle    Physical activity:     Days per week: Not on file     Minutes per session: Not on file    Stress: Not at all   Relationships    Social connections:     Talks on phone: Not on file     Gets together: Not on file     Attends Jewish service: Not on file     Active member of club or organization: Not on file     Attends meetings of clubs or organizations: Not on file     Relationship status: Not on file   Other Topics Concern    Not on file   Social History Narrative    Not on file     Family History   Problem Relation Age of Onset    Heart disease Mother         bypass    Cancer Father         Prostate removal     Heart disease Father         CHF    Vision loss Father         Macular degen       Vitals:    01/24/20 0753   BP: 139/86   Pulse: 64   Temp: 98.3 °F (36.8 °C)      Body mass index is 29.06 kg/m².     Review of Systems   Constitutional: Negative for chills, fever and unexpected weight change.   HENT: Negative for ear pain and sore throat.    Eyes: Negative for redness and visual disturbance.   Respiratory: Negative for cough and shortness of breath.    Cardiovascular: Negative for chest pain and palpitations.   Gastrointestinal: Negative for nausea and vomiting.   Musculoskeletal: Negative for arthralgias and myalgias.   Skin: Negative for rash and wound.   Neurological: Negative for weakness and headaches.         Objective:      Physical Exam   Constitutional: He is oriented to person, place, and time. He appears well-developed and well-nourished. No distress.   HENT:   Head: Normocephalic and atraumatic.   Eyes: Pupils are equal, round, and reactive to light. EOM are normal.   Neck: Normal range of motion. Neck supple.   Cardiovascular: Normal rate, regular rhythm, normal heart sounds and intact distal pulses.   No murmur heard.  Pulmonary/Chest: Effort normal and breath sounds normal. No respiratory distress. He has no wheezes.   Musculoskeletal: Normal range of motion. He exhibits no edema.   Neurological: He is alert and oriented to person, place, and time. No cranial nerve deficit.   Skin: Skin is warm and dry. Capillary refill takes less than 2 seconds.   Psychiatric: He has a normal mood and affect. His behavior is normal.   Nursing note and vitals reviewed.        Assessment / Plan:      1. Z00.01 -patient here for annual wellness exam.  Labs ordered.  Health maintenance was reviewed and ordered.    See other no for chronic conditions.  Stable elevated LFTs at her chronic.  Fatty liver with gallstones.  Repeat ultrasound in one year.  Follow-up with GI.    Complete history and physical was completed  today.  Complete and thorough medication reconciliation was performed.  Discussed risks and benefits of medications.  Advised patient on orders and health maintenance.  We discussed old records and old labs if available.  Will request any records not available through epic.  Continue current medications listed on your summary sheet.    All questions were answered. Patient had no further concerns. Advised of diagnoses and plan. Follow up as planned or return sooner if symptoms persist or worsen.     Orders Placed This Encounter   Procedures    Hepatic function panel     Standing Status:   Future     Standing Expiration Date:   1/24/2021        Joseph Garcia MD

## 2025-04-18 NOTE — TELEPHONE ENCOUNTER
Called pt to inform him that rx was sent to pharmacy but the phone just rang, please advise!   Hx of chronic hypotension on midodrine, ischemic cardiomyopathy with EF35-40%, Cirrhosis  Presents following fall. Noted to have profound hypotension 60/40 per report. Initial concern for hemorrhagic shock as bedside ultrasound showed abdominal fluid.  Patient was sent for stat CT but became hypoxic when lying flat and was intubated.  Patient was transfused 2 units of whole blood.  No evidence of sepsis  CTA chest - no evidence of PE  CT abdomen-Small subcutaneous hematoma in the right hip without evidence of active bleeding   Patient altered ,hypervolemia and well perfused, CXR pulmonary vascular congestion and pleural effusion   Lactic acid normal   Upon arrival to the ICU patient was on Levophed with minimal ventilator support.  Sedation was turned off and started a SBT that he passed.  Patient was then extubated to BiPAP and weaned to nasal cannula. Pt now on room air.    Plan   Monitor off antibiotics   Follow up blood and sputum cultures

## (undated) DEVICE — EVACUATOR BLADDER ELLIK - (10/BX)

## (undated) DEVICE — CONNECTOR HOSE NEPTUNE FOR CYSTO ROOM

## (undated) DEVICE — SET EXTENSION WITH 2 PORTS (48EA/CA) ***PART #2C8610 IS A SUBSTITUTE*****

## (undated) DEVICE — JELLY, KY 2 0Z STERILE

## (undated) DEVICE — SUCTION INSTRUMENT YANKAUER BULBOUS TIP W/O VENT (50EA/CA)

## (undated) DEVICE — KIT ANESTHESIA W/CIRCUIT & 3/LT BAG W/FILTER (20EA/CA)

## (undated) DEVICE — PROTECTOR ULNA NERVE - (36PR/CA)

## (undated) DEVICE — SET LEADWIRE 5 LEAD BEDSIDE DISPOSABLE ECG (1SET OF 5/EA)

## (undated) DEVICE — DRESSING NON-ADHERING 8 X 3 - (50/BX)

## (undated) DEVICE — CORD RESECTO DISP ACT DAC-1 FOR USA RESECTOSCOPE (12EA/BX)

## (undated) DEVICE — GOWN SURGEONS X-LARGE - DISP. (30/CA)

## (undated) DEVICE — SET IRRIGATION CYSTOSCOPY Y-TYPE L81 IN (20EA/CA)

## (undated) DEVICE — SENSOR SPO2 NEO LNCS ADHESIVE (20/BX) SEE USER NOTES

## (undated) DEVICE — NEPTUNE 4 PORT MANIFOLD - (20/PK)

## (undated) DEVICE — GOWN SURGICAL X-LARGE ULTRA - FILM-REINFORCED (20/CA)

## (undated) DEVICE — WATER IRRIG. STER. 1500 ML - (9/CA)

## (undated) DEVICE — GLOVE, BIOGEL ECLIPSE, SZ 7.0, PF LTX (50/BX)

## (undated) DEVICE — GOWN WARMING STANDARD FLEX - (30/CA)

## (undated) DEVICE — CUTTING LOOP 24FR - 10/BX OLD #MLE-24-012

## (undated) DEVICE — BAG URODRAIN WITH TUBING - (20/CA)

## (undated) DEVICE — SPONGE GAUZESTER 4 X 4 4PLY - (128PK/CA)

## (undated) DEVICE — TUBE CONNECT SUCTION CLEAR 120 X 1/4" (50EA/CA)"

## (undated) DEVICE — TUBING CLEARLINK DUO-VENT - C-FLO (48EA/CA)

## (undated) DEVICE — KIT ROOM DECONTAMINATION

## (undated) DEVICE — BAG DRAINAGE URINARY CLOSED 2000ML (20EA/CA)

## (undated) DEVICE — CATHETER FOLEY 22 FR 30 CC (12EA/CA)

## (undated) DEVICE — SPINAL

## (undated) DEVICE — LACTATED RINGERS INJ 1000 ML - (14EA/CA 60CA/PF)

## (undated) DEVICE — ELECTRODE 850 FOAM ADHESIVE - HYDROGEL RADIOTRNSPRNT (50/PK)

## (undated) DEVICE — CORD ELECTROSURG. TUR DISP (50EA/CA)

## (undated) DEVICE — WATER IRRIG. STER 3000 ML - (4/CA)

## (undated) DEVICE — ELECTRODE DUAL RETURN W/ CORD - (50/PK)

## (undated) DEVICE — GLOVE BIOGEL SZ 7.5 SURGICAL PF LTX - (50PR/BX 4BX/CA)

## (undated) DEVICE — GLOVE BIOGEL PI INDICATOR SZ 7.0 SURGICAL PF LF - (50/BX 4BX/CA)

## (undated) DEVICE — MASK ANESTHESIA ADULT  - (100/CA)

## (undated) DEVICE — SET FLUID WARMING IRRIGATRION CYSTOSCOPY (10EA/CA)

## (undated) DEVICE — COVER FOOT UNIVERSAL DISP. - (25EA/CA)

## (undated) DEVICE — CON SEDATION/>5 YR 1ST 15 MIN

## (undated) DEVICE — TRAY SPINAL ANESTHESIA NON-SAFETY (10/CA)

## (undated) DEVICE — PACK CYSTOSCOPY - (14/CA)

## (undated) DEVICE — SLEEVE, VASO, THIGH, MED

## (undated) DEVICE — HEAD HOLDER JUNIOR/ADULT

## (undated) DEVICE — CON SEDATION EA ADDL 15 MIN

## (undated) DEVICE — SYRINGE DISP. 12 CC LL - (100/BX)

## (undated) DEVICE — PACK SINGLE BASIN - (6/CA)